# Patient Record
Sex: FEMALE | Race: WHITE | NOT HISPANIC OR LATINO | Employment: FULL TIME | URBAN - METROPOLITAN AREA
[De-identification: names, ages, dates, MRNs, and addresses within clinical notes are randomized per-mention and may not be internally consistent; named-entity substitution may affect disease eponyms.]

---

## 2019-10-11 ENCOUNTER — TELEPHONE (OUTPATIENT)
Dept: OBGYN CLINIC | Facility: HOSPITAL | Age: 49
End: 2019-10-11

## 2019-10-11 NOTE — TELEPHONE ENCOUNTER
Patient lvm asking for an appt for a 2nd opinion on an injury to her wrist & thumb area      [phone]    I did call the patient back & lvm asking her to call us to schedule the appt,

## 2019-10-17 ENCOUNTER — OFFICE VISIT (OUTPATIENT)
Dept: OBGYN CLINIC | Facility: CLINIC | Age: 49
End: 2019-10-17
Payer: COMMERCIAL

## 2019-10-17 VITALS
HEART RATE: 77 BPM | WEIGHT: 163.8 LBS | HEIGHT: 62 IN | SYSTOLIC BLOOD PRESSURE: 129 MMHG | BODY MASS INDEX: 30.14 KG/M2 | DIASTOLIC BLOOD PRESSURE: 89 MMHG

## 2019-10-17 DIAGNOSIS — M18.12 ARTHRITIS OF CARPOMETACARPAL (CMC) JOINT OF LEFT THUMB: Primary | ICD-10-CM

## 2019-10-17 PROCEDURE — 20660 APPLY REM FIXATION DEVICE: CPT | Performed by: ORTHOPAEDIC SURGERY

## 2019-10-17 PROCEDURE — 99203 OFFICE O/P NEW LOW 30 MIN: CPT | Performed by: ORTHOPAEDIC SURGERY

## 2019-10-17 RX ORDER — ALPRAZOLAM 0.5 MG/1
0.5 TABLET ORAL 2 TIMES DAILY PRN
Refills: 0 | COMMUNITY
Start: 2019-09-10

## 2019-10-17 RX ORDER — FLUNISOLIDE 0.25 MG/ML
SOLUTION NASAL
COMMUNITY
Start: 2018-09-17

## 2019-10-17 RX ADMIN — Medication 0.5 MEQ: at 20:45

## 2019-10-17 RX ADMIN — TRIAMCINOLONE ACETONIDE 20 MG: 40 INJECTION, SUSPENSION INTRA-ARTICULAR; INTRAMUSCULAR at 20:45

## 2019-10-17 RX ADMIN — LIDOCAINE HYDROCHLORIDE 2.5 ML: 10 INJECTION, SOLUTION INFILTRATION; PERINEURAL at 20:45

## 2019-10-17 NOTE — PATIENT INSTRUCTIONS
A cortisone injection was offered today and the patent wished to proceed  The patient may experience increased pain at the injection site for a few days  Topical ice is recommended today, followed by topical heat  NSAIDS and Tylenol may be used, as long as they are not contraindicated

## 2019-10-18 NOTE — PROGRESS NOTES
CHIEF COMPLAINT:  Chief Complaint   Patient presents with    Left Wrist - Pain       SUBJECTIVE:  Jesse Rubio is a 52y o  year old RHD female who presents left wrist pain  Patient was previously seen at [address] [place] [address] and 2019 for similar symptoms  She had initially tried some Tylenol, anti-inflammatories and was fitted for a thumb spica splint  She noted that this did not help with her symptoms  On 2019 she had a cortisone injection into the left thumb CMC joint  She had a good response with that however her symptoms came back  She states she has pain at the base of her thumb  She notes stiffness 1st thing in the morning  She has been trying some anti-inflammatories with minimal relief  She has been wearing a brace that provides immobilization for the thumb  She denies any numbness or tingling  PAST MEDICAL HISTORY:  History reviewed  No pertinent past medical history  PAST SURGICAL HISTORY:  Past Surgical History:   Procedure Laterality Date     SECTION      FOOT SURGERY      HERNIA REPAIR      LIPOSUCTION         FAMILY HISTORY:  History reviewed  No pertinent family history      SOCIAL HISTORY:  Social History     Tobacco Use    Smoking status: Never Smoker    Smokeless tobacco: Never Used   Substance Use Topics    Alcohol use: Yes     Frequency: Monthly or less     Comment: socially    Drug use: Never       MEDICATIONS:    Current Outpatient Medications:     ALPRAZolam (XANAX) 0 5 mg tablet, Take 0 5 mg by mouth 2 (two) times a day as needed, Disp: , Rfl: 0    flunisolide (NASALIDE) 25 MCG/ACT (0 025%) SOLN, flunisolide 25 mcg (0 025 %) nasal spray, Disp: , Rfl:     Multiple Vitamins-Minerals (MULTIVITAMIN ADULT PO), 1 DAILY, Disp: , Rfl:     ALLERGIES:  Allergies   Allergen Reactions    Diphenhydramine Tachycardia     Other reaction(s): Unknown  Other reaction(s): panic attack, swelling, hives      Amoxicillin Hives and Rash REVIEW OF SYSTEMS:  Review of Systems  ROS:   General: no fever, no chills  HEENT:  No loss of hearing or eyesight problems  Eyes:  No red eyes  Respiratory:  No coughing, shortness of breath or wheezing  Cardiovascular:  No chest pain, no palpitations  GI:  Abdomen soft nontender, denies nausea  Endocrine:  No muscle weakness, no frequent urination, no excessive thirst  Urinary:  No dysuria, no incontinence  Musculoskeletal: see HPI and PE  SKIN:  No skin rash, no dry skin  Neurological:  No headaches, no confusion  Psychiatric:  No suicide thoughts, no anxiety, no depression  Review of all other systems is negative    VITALS:  Vitals:    10/17/19 1427   BP: 129/89   Pulse: 77       LABS:  HgA1c: No results found for: HGBA1C  BMP: No results found for: GLUCOSE, CALCIUM, NA, K, CO2, CL, BUN, CREATININE    _____________________________________________________  PHYSICAL EXAMINATION:  General: well developed and well nourished, alert, oriented times 3 and appears comfortable  Psychiatric: Normal  HEENT: Trachea Midline, No torticollis  Pulmonary: No audible wheezing or respiratory distress   Skin: No masses, erythema, lacerations, fluctation, ulcerations  Neurovascular: Sensation Intact to the Median, Ulnar, Radial Nerve, Motor Intact to the Median, Ulnar, Radial Nerve and Pulses Intact    MUSCULOSKELETAL EXAMINATION:  left CMC Exam:  No adduction contracture  No hyperextension deformity of MCP joint  Positive localized tenderness over radial and dorsal aspect of thumb (CMC joint)  Grind test is Positive for pain and Positive for crepitus  No triggering or tenderness over the A1 pulley  No pain with Finkelsteins maneuver     ___________________________________________________  STUDIES REVIEWED:  No studies reviewed         PROCEDURES PERFORMED:  Hand/upper extremity injection  Date/Time: 10/17/2019 8:45 PM  Consent given by: patient  Site marked: site marked  Timeout: Immediately prior to procedure a time out was called to verify the correct patient, procedure, equipment, support staff and site/side marked as required   Supporting Documentation  Indications: pain   Procedure Details  Condition comment: Left thumb CMC joint   Preparation: Patient was prepped and draped in the usual sterile fashion  Needle size: 25 G  Ultrasound guidance: no  Approach: radial  Medications administered: 2 5 mL lidocaine 1 %; 0 5 mEq sodium bicarbonate 8 4 %; 20 mg triamcinolone acetonide 40 mg/mL    Patient tolerance: patient tolerated the procedure well with no immediate complications  Dressing:  Sterile dressing applied              _____________________________________________________  ASSESSMENT/PLAN:      Diagnoses and all orders for this visit:    Arthritis of carpometacarpal (CMC) joint of left thumb  -A cortisone injection was offered today and the patent wished to proceed  The patient may experience increased pain at the injection site for a few days  Topical ice is recommended today, followed by topical heat  NSAIDS and Tylenol may be used, as long as they are not contraindicated  - patient was advised to stop using the thumb spica brace as this may cause some more stiffness  - she can take Tylenol anti-inflammatories as needed for pain  - she was advised to use heat over the area  - she will follow-up with us in 2 weeks for re-evaluation    Follow Up:  Return in about 2 weeks (around 10/31/2019)  Work/school status:  No restrictions    To Do Next Visit:  Re-evaluation of current issue    General Discussions:  CMC Arthritis: The anatomy and physiology of carpometacarpal joint arthritis was discussed with the patient today in the office  Deterioration of the articular cartilage eventually leads to hypermobility at the thumb [facility] joint, resulting in joint subluxation, osteophyte formation, cystic changes within the trapezium and base of the first metacarpal, as well as subchondral sclerosis    Eventually, pain, limited mobility, and compensatory hyperextension at the metacarpophalangeal joint may develop  While normal activity and usage of the thumb joint may provide a painful experience to the patient, this typically does not result in damage to the thumb or hand  Treatment options include resting thumb spica splints to decreased joint edema, pain, and inflammation  Therapy exercises to strengthen the thenar musculature may relieve pain, but do not alter the overall continued development of osteoarthritis  Oral medications, topical medications, corticosteroid injections may decrease pain and increase overall function  Eventually, approximately 5% of patients may require surgical intervention                                                                                                                                                                                                   Scribe Attestation    I,:   Yeimi Pollock PA-C am acting as a scribe while in the presence of the attending physician :        I,:   Eamon Soto MD personally performed the services described in this documentation    as scribed in my presence :

## 2019-10-21 RX ORDER — TRIAMCINOLONE ACETONIDE 40 MG/ML
20 INJECTION, SUSPENSION INTRA-ARTICULAR; INTRAMUSCULAR
Status: COMPLETED | OUTPATIENT
Start: 2019-10-17 | End: 2019-10-17

## 2019-10-21 RX ORDER — LIDOCAINE HYDROCHLORIDE 10 MG/ML
2.5 INJECTION, SOLUTION INFILTRATION; PERINEURAL
Status: COMPLETED | OUTPATIENT
Start: 2019-10-17 | End: 2019-10-17

## 2019-10-31 ENCOUNTER — OFFICE VISIT (OUTPATIENT)
Dept: OBGYN CLINIC | Facility: CLINIC | Age: 49
End: 2019-10-31
Payer: COMMERCIAL

## 2019-10-31 VITALS
SYSTOLIC BLOOD PRESSURE: 111 MMHG | BODY MASS INDEX: 29.63 KG/M2 | DIASTOLIC BLOOD PRESSURE: 66 MMHG | WEIGHT: 161 LBS | HEIGHT: 62 IN | RESPIRATION RATE: 18 BRPM | HEART RATE: 88 BPM

## 2019-10-31 DIAGNOSIS — M18.12 ARTHRITIS OF CARPOMETACARPAL (CMC) JOINT OF LEFT THUMB: Primary | ICD-10-CM

## 2019-10-31 PROCEDURE — 99213 OFFICE O/P EST LOW 20 MIN: CPT | Performed by: ORTHOPAEDIC SURGERY

## 2019-10-31 NOTE — PROGRESS NOTES
CHIEF COMPLAINT:  Chief Complaint   Patient presents with    Left Wrist - Follow-up, Pain       SUBJECTIVE:  Dusty Garland is a 52y o  year old RHD female who presents to the office S/P left CMC injection performed 10/17/2019  Patient was previously seen at [facility] in 2019 for similar symptoms  Patient received previous cortisone injection 2019  Pt states that she does have some continued soreness but feels much better than prior to the CSI  PAST MEDICAL HISTORY:  History reviewed  No pertinent past medical history  PAST SURGICAL HISTORY:  Past Surgical History:   Procedure Laterality Date     SECTION      FOOT SURGERY      HERNIA REPAIR      LIPOSUCTION         FAMILY HISTORY:  Family History   Problem Relation Age of Onset   Pino COPD Mother     Diabetes Mother     Thyroid disease Paternal Aunt     Diabetes Maternal Grandmother        SOCIAL HISTORY:  Social History     Tobacco Use    Smoking status: Never Smoker    Smokeless tobacco: Never Used   Substance Use Topics    Alcohol use: Yes     Frequency: Monthly or less     Comment: socially    Drug use: Never       MEDICATIONS:    Current Outpatient Medications:     ALPRAZolam (XANAX) 0 5 mg tablet, Take 0 5 mg by mouth 2 (two) times a day as needed, Disp: , Rfl: 0    flunisolide (NASALIDE) 25 MCG/ACT (0 025%) SOLN, flunisolide 25 mcg (0 025 %) nasal spray, Disp: , Rfl:     Multiple Vitamins-Minerals (MULTIVITAMIN ADULT PO), 1 DAILY, Disp: , Rfl:     ALLERGIES:  Allergies   Allergen Reactions    Diphenhydramine Tachycardia     Other reaction(s): Unknown  Other reaction(s): panic attack, swelling, hives      Amoxicillin Hives and Rash       REVIEW OF SYSTEMS:  Review of Systems   Constitutional: Negative for chills, fever and unexpected weight change  HENT: Negative for hearing loss, nosebleeds and sore throat  Eyes: Negative for pain, redness and visual disturbance     Respiratory: Negative for cough, shortness of breath and wheezing  Cardiovascular: Negative for chest pain, palpitations and leg swelling  Gastrointestinal: Negative for abdominal pain, nausea and vomiting  Endocrine: Negative for polydipsia and polyuria  Genitourinary: Negative for dysuria and hematuria  Skin: Negative for rash and wound  Neurological: Negative for dizziness and headaches  Psychiatric/Behavioral: Negative for decreased concentration, dysphoric mood and suicidal ideas  The patient is not nervous/anxious  VITALS:  Vitals:    10/31/19 0936   BP: 111/66   Pulse: 88   Resp: 18       LABS:  HgA1c: No results found for: HGBA1C  BMP: No results found for: GLUCOSE, CALCIUM, NA, K, CO2, CL, BUN, CREATININE    _____________________________________________________  PHYSICAL EXAMINATION:  General: well developed and well nourished, alert, oriented times 3 and appears comfortable  Psychiatric: Normal  HEENT: Trachea Midline, No torticollis  Pulmonary: No audible wheezing or respiratory distress   Skin: No masses, erythema, lacerations, fluctation, ulcerations  Neurovascular: Sensation Intact to the Median, Ulnar, Radial Nerve, Motor Intact to the Median, Ulnar, Radial Nerve and Pulses Intact    MUSCULOSKELETAL EXAMINATION:  left CMC Exam:  No adduction contracture  No hyperextension deformity of MCP joint  Negative localized tenderness over radial and dorsal aspect of thumb (CMC joint)  Grind test is Negative for pain and Positive for crepitus  No triggering or tenderness over the A1 pulley  No pain with Finkelsteins maneuver     ___________________________________________________  STUDIES REVIEWED:  No studies reviewed         PROCEDURES PERFORMED:  Procedures  No Procedures performed today    _____________________________________________________  ASSESSMENT/PLAN:    S/P left CMC CSI   * Pt was advised to continue to take analgesics for pain as needed  * Pt was advised that if her pain returns and she can not control it she may return for repeat CSI      Follow Up:  Return if symptoms worsen or fail to improve          To Do Next Visit:  Re-evaluation of current issue      Scribe Attestation    I,:   Vivi Vargas am acting as a scribe while in the presence of the attending physician :        I,:   Juan Britton MD personally performed the services described in this documentation    as scribed in my presence :

## 2020-01-16 ENCOUNTER — APPOINTMENT (OUTPATIENT)
Dept: RADIOLOGY | Facility: CLINIC | Age: 50
End: 2020-01-16
Payer: COMMERCIAL

## 2020-01-16 ENCOUNTER — OFFICE VISIT (OUTPATIENT)
Dept: OBGYN CLINIC | Facility: CLINIC | Age: 50
End: 2020-01-16
Payer: COMMERCIAL

## 2020-01-16 VITALS
DIASTOLIC BLOOD PRESSURE: 78 MMHG | HEIGHT: 62 IN | SYSTOLIC BLOOD PRESSURE: 121 MMHG | BODY MASS INDEX: 29.63 KG/M2 | HEART RATE: 73 BPM | WEIGHT: 161 LBS

## 2020-01-16 DIAGNOSIS — M18.12 ARTHRITIS OF CARPOMETACARPAL (CMC) JOINT OF LEFT THUMB: ICD-10-CM

## 2020-01-16 DIAGNOSIS — M18.12 ARTHRITIS OF CARPOMETACARPAL (CMC) JOINT OF LEFT THUMB: Primary | ICD-10-CM

## 2020-01-16 PROCEDURE — 20600 DRAIN/INJ JOINT/BURSA W/O US: CPT | Performed by: ORTHOPAEDIC SURGERY

## 2020-01-16 PROCEDURE — 73110 X-RAY EXAM OF WRIST: CPT

## 2020-01-16 PROCEDURE — 99213 OFFICE O/P EST LOW 20 MIN: CPT | Performed by: ORTHOPAEDIC SURGERY

## 2020-01-16 RX ORDER — LIDOCAINE HYDROCHLORIDE 10 MG/ML
0.5 INJECTION, SOLUTION INFILTRATION; PERINEURAL
Status: COMPLETED | OUTPATIENT
Start: 2020-01-16 | End: 2020-01-16

## 2020-01-16 RX ORDER — LIDOCAINE HYDROCHLORIDE 10 MG/ML
2 INJECTION, SOLUTION INFILTRATION; PERINEURAL
Status: COMPLETED | OUTPATIENT
Start: 2020-01-16 | End: 2020-01-16

## 2020-01-16 RX ORDER — TRIAMCINOLONE ACETONIDE 40 MG/ML
20 INJECTION, SUSPENSION INTRA-ARTICULAR; INTRAMUSCULAR
Status: COMPLETED | OUTPATIENT
Start: 2020-01-16 | End: 2020-01-16

## 2020-01-16 RX ADMIN — LIDOCAINE HYDROCHLORIDE 2 ML: 10 INJECTION, SOLUTION INFILTRATION; PERINEURAL at 12:16

## 2020-01-16 RX ADMIN — Medication 0.05 MEQ: at 12:16

## 2020-01-16 RX ADMIN — LIDOCAINE HYDROCHLORIDE 0.5 ML: 10 INJECTION, SOLUTION INFILTRATION; PERINEURAL at 12:16

## 2020-01-16 RX ADMIN — TRIAMCINOLONE ACETONIDE 20 MG: 40 INJECTION, SUSPENSION INTRA-ARTICULAR; INTRAMUSCULAR at 12:16

## 2020-01-16 NOTE — PROGRESS NOTES
CHIEF COMPLAINT:  Chief Complaint   Patient presents with    Left Thumb - Follow-up       SUBJECTIVE:  Nehemias Felix is a 52y o  year old RHD female who presents to the office for a follow-up for left CMC osteoarthritis  Patient previously received cortisone steroid injections on 2019 at \[facility]\"" and 10/17/2019  Pt states that the CSI provides temporary relief  Patient states that she is currently having significant discomfort at the base of her left thumb  Patient states she has difficulty with grasping and lifting heavy objects  Patient states that she does not really take anything for pain  PAST MEDICAL HISTORY:  History reviewed  No pertinent past medical history  PAST SURGICAL HISTORY:  Past Surgical History:   Procedure Laterality Date     SECTION      FOOT SURGERY      HERNIA REPAIR      LIPOSUCTION         FAMILY HISTORY:  Family History   Problem Relation Age of Onset   Lotus Kugel COPD Mother     Diabetes Mother     Thyroid disease Paternal Aunt     Diabetes Maternal Grandmother        SOCIAL HISTORY:  Social History     Tobacco Use    Smoking status: Never Smoker    Smokeless tobacco: Never Used   Substance Use Topics    Alcohol use: Yes     Frequency: Monthly or less     Comment: socially    Drug use: Never       MEDICATIONS:    Current Outpatient Medications:     ALPRAZolam (XANAX) 0 5 mg tablet, Take 0 5 mg by mouth 2 (two) times a day as needed, Disp: , Rfl: 0    flunisolide (NASALIDE) 25 MCG/ACT (0 025%) SOLN, flunisolide 25 mcg (0 025 %) nasal spray, Disp: , Rfl:     Multiple Vitamins-Minerals (MULTIVITAMIN ADULT PO), 1 DAILY, Disp: , Rfl:     ALLERGIES:  Allergies   Allergen Reactions    Diphenhydramine Tachycardia     Other reaction(s): Unknown  Other reaction(s): panic attack, swelling, hives      Amoxicillin Hives and Rash       REVIEW OF SYSTEMS:  Review of Systems   Constitutional: Negative for chills, fever and unexpected weight change     HENT: Negative for hearing loss, nosebleeds and sore throat  Eyes: Negative for pain, redness and visual disturbance  Respiratory: Negative for cough, shortness of breath and wheezing  Cardiovascular: Negative for chest pain, palpitations and leg swelling  Gastrointestinal: Negative for abdominal pain, nausea and vomiting  Endocrine: Negative for polydipsia and polyuria  Genitourinary: Negative for dysuria and hematuria  Skin: Negative for rash and wound  Neurological: Negative for dizziness and headaches  Psychiatric/Behavioral: Negative for decreased concentration, dysphoric mood and suicidal ideas  The patient is not nervous/anxious          VITALS:  Vitals:    01/16/20 1153   BP: 121/78   Pulse: 73       LABS:  HgA1c: No results found for: HGBA1C  BMP: No results found for: GLUCOSE, CALCIUM, NA, K, CO2, CL, BUN, CREATININE    _____________________________________________________  PHYSICAL EXAMINATION:  General: well developed and well nourished, alert, oriented times 3 and appears comfortable  Psychiatric: Normal  HEENT: Trachea Midline, No torticollis  Pulmonary: No audible wheezing or strider  Cardiovascular: No discernable arrhythmia   Skin: No masses, erythema, lacerations, fluctation, ulcerations  Neurovascular: Sensation Intact to the Median, Ulnar, Radial Nerve, Motor Intact to the Median, Ulnar, Radial Nerve and Pulses Intact    MUSCULOSKELETAL EXAMINATION:  left CMC Exam:  No adduction contracture  No hyperextension deformity of MCP joint  Positive localized tenderness over radial and dorsal aspect of thumb (CMC joint)  Grind test is Positive for pain and Positive for crepitus  No triggering or tenderness over the A1 pulley  No pain with Finkelsteins maneuver             ___________________________________________________  STUDIES REVIEWED:  Three views of left wrist with Crystal performed today show CMC osteoarthritis with beaking and cystic changes    PROCEDURES PERFORMED:  Small joint arthrocentesis: L thumb CMC  Date/Time: 1/16/2020 12:16 PM  Consent given by: patient  Site marked: site marked  Timeout: Immediately prior to procedure a time out was called to verify the correct patient, procedure, equipment, support staff and site/side marked as required   Supporting Documentation  Indications: pain   Procedure Details  Location: thumb - L thumb CMC  Needle size: 27 G  Ultrasound guidance: no  Medications administered: 0 05 mEq sodium bicarbonate 8 4 %; 20 mg triamcinolone acetonide 40 mg/mL; 2 mL lidocaine 1 %; 0 5 mL lidocaine 1 %    Patient tolerance: patient tolerated the procedure well with no immediate complications  Dressing:  Sterile dressing applied             _____________________________________________________  ASSESSMENT/PLAN:    Left CMC Osteoarthritis  *CSI was administered today without complications  *Pt was advised that they may have increased pain for the next 24-36 hours from the CSI before feeling relief, during this time pt was advised to take NSAIDs and apply ice  *After the initial 36 hours pt is advised to apply heat and continue motion of the thumb to decrease discomfort  *Pt was advised that activity modification such as resting after increased activity or taking NSAIDs prior to increased activity may be necessary    *Pt was advised that these CSI should not be administered more frequently than 3 months apart  *Pt will follow up in 2 weeks        Follow Up:  Return if symptoms worsen or fail to improve  To Do Next Visit:  Re-evaluation of current issue    General Discussions:  [facility] Arthritis: The anatomy and physiology of carpometacarpal joint arthritis was discussed with the patient today in the office  Deterioration of the articular cartilage eventually leads to hypermobility at the thumb [facility] joint, resulting in joint subluxation, osteophyte formation, cystic changes within the trapezium and base of the first metacarpal, as well as subchondral sclerosis  Eventually, pain, limited mobility, and compensatory hyperextension at the metacarpophalangeal joint may develop  While normal activity and usage of the thumb joint may provide a painful experience to the patient, this typically does not result in damage to the thumb or hand  Treatment options include resting thumb spica splints to decreased joint edema, pain, and inflammation  Therapy exercises to strengthen the thenar musculature may relieve pain, but do not alter the overall continued development of osteoarthritis  Oral medications, topical medications, corticosteroid injections may decrease pain and increase overall function  Eventually, approximately 5% of patients may require surgical intervention                                                                                                                                                                                                       Scribe Attestation    I,:   Avtar Navarro am acting as a scribe while in the presence of the attending physician :        I,:   Prema Rodriguez MD personally performed the services described in this documentation    as scribed in my presence :

## 2020-04-20 ENCOUNTER — TELEPHONE (OUTPATIENT)
Dept: OBGYN CLINIC | Facility: HOSPITAL | Age: 50
End: 2020-04-20

## 2020-04-23 ENCOUNTER — TELEPHONE (OUTPATIENT)
Dept: OBGYN CLINIC | Facility: CLINIC | Age: 50
End: 2020-04-23

## 2020-04-28 ENCOUNTER — OFFICE VISIT (OUTPATIENT)
Dept: OBGYN CLINIC | Facility: CLINIC | Age: 50
End: 2020-04-28
Payer: COMMERCIAL

## 2020-04-28 VITALS
HEART RATE: 79 BPM | HEIGHT: 62 IN | SYSTOLIC BLOOD PRESSURE: 121 MMHG | RESPIRATION RATE: 18 BRPM | BODY MASS INDEX: 30.14 KG/M2 | WEIGHT: 163.8 LBS | DIASTOLIC BLOOD PRESSURE: 79 MMHG

## 2020-04-28 DIAGNOSIS — M18.12 ARTHRITIS OF CARPOMETACARPAL (CMC) JOINT OF LEFT THUMB: Primary | ICD-10-CM

## 2020-04-28 PROCEDURE — 20600 DRAIN/INJ JOINT/BURSA W/O US: CPT | Performed by: ORTHOPAEDIC SURGERY

## 2020-04-28 PROCEDURE — 99213 OFFICE O/P EST LOW 20 MIN: CPT | Performed by: ORTHOPAEDIC SURGERY

## 2020-04-28 RX ADMIN — LIDOCAINE HYDROCHLORIDE 2.5 ML: 10 INJECTION, SOLUTION INFILTRATION; PERINEURAL at 10:05

## 2020-04-28 RX ADMIN — Medication 0.05 MEQ: at 10:05

## 2020-04-28 RX ADMIN — TRIAMCINOLONE ACETONIDE 20 MG: 40 INJECTION, SUSPENSION INTRA-ARTICULAR; INTRAMUSCULAR at 10:05

## 2020-04-30 RX ORDER — TRIAMCINOLONE ACETONIDE 40 MG/ML
20 INJECTION, SUSPENSION INTRA-ARTICULAR; INTRAMUSCULAR
Status: COMPLETED | OUTPATIENT
Start: 2020-04-28 | End: 2020-04-28

## 2020-04-30 RX ORDER — LIDOCAINE HYDROCHLORIDE 10 MG/ML
2.5 INJECTION, SOLUTION INFILTRATION; PERINEURAL
Status: COMPLETED | OUTPATIENT
Start: 2020-04-28 | End: 2020-04-28

## 2020-06-03 ENCOUNTER — HOSPITAL ENCOUNTER (EMERGENCY)
Facility: HOSPITAL | Age: 50
Discharge: HOME | End: 2020-06-03
Attending: EMERGENCY MEDICINE | Admitting: EMERGENCY MEDICINE
Payer: COMMERCIAL

## 2020-06-03 VITALS
OXYGEN SATURATION: 100 % | DIASTOLIC BLOOD PRESSURE: 87 MMHG | BODY MASS INDEX: 31.48 KG/M2 | SYSTOLIC BLOOD PRESSURE: 172 MMHG | HEART RATE: 103 BPM | RESPIRATION RATE: 20 BRPM | WEIGHT: 171.08 LBS | TEMPERATURE: 98.3 F | HEIGHT: 62 IN

## 2020-06-03 DIAGNOSIS — S81.812A LEG LACERATION, LEFT, INITIAL ENCOUNTER: Primary | ICD-10-CM

## 2020-06-03 PROCEDURE — 99282 EMERGENCY DEPT VISIT SF MDM: CPT | Performed by: EMERGENCY MEDICINE

## 2020-06-03 PROCEDURE — 12001 RPR S/N/AX/GEN/TRNK 2.5CM/<: CPT | Performed by: EMERGENCY MEDICINE

## 2020-06-03 PROCEDURE — 90471 IMMUNIZATION ADMIN: CPT

## 2020-06-03 PROCEDURE — 99282 EMERGENCY DEPT VISIT SF MDM: CPT

## 2020-06-03 PROCEDURE — 90715 TDAP VACCINE 7 YRS/> IM: CPT | Performed by: EMERGENCY MEDICINE

## 2020-06-03 RX ADMIN — TETANUS TOXOID, REDUCED DIPHTHERIA TOXOID AND ACELLULAR PERTUSSIS VACCINE, ADSORBED 0.5 ML: 5; 2.5; 8; 8; 2.5 SUSPENSION INTRAMUSCULAR at 12:34

## 2021-03-05 ENCOUNTER — HOSPITAL ENCOUNTER (EMERGENCY)
Facility: HOSPITAL | Age: 51
Discharge: HOME | End: 2021-03-05
Attending: EMERGENCY MEDICINE
Payer: COMMERCIAL

## 2021-03-05 ENCOUNTER — HOSPITAL ENCOUNTER (EMERGENCY)
Facility: HOSPITAL | Age: 51
Discharge: HOME | End: 2021-03-05
Attending: EMERGENCY MEDICINE | Admitting: EMERGENCY MEDICINE
Payer: COMMERCIAL

## 2021-03-05 ENCOUNTER — APPOINTMENT (EMERGENCY)
Dept: CT IMAGING | Facility: HOSPITAL | Age: 51
End: 2021-03-05
Payer: COMMERCIAL

## 2021-03-05 ENCOUNTER — APPOINTMENT (EMERGENCY)
Dept: RADIOLOGY | Facility: HOSPITAL | Age: 51
End: 2021-03-05
Payer: COMMERCIAL

## 2021-03-05 VITALS
WEIGHT: 169.09 LBS | OXYGEN SATURATION: 98 % | SYSTOLIC BLOOD PRESSURE: 136 MMHG | BODY MASS INDEX: 30.93 KG/M2 | HEART RATE: 101 BPM | RESPIRATION RATE: 20 BRPM | TEMPERATURE: 98 F | DIASTOLIC BLOOD PRESSURE: 83 MMHG

## 2021-03-05 VITALS
OXYGEN SATURATION: 99 % | RESPIRATION RATE: 20 BRPM | SYSTOLIC BLOOD PRESSURE: 115 MMHG | TEMPERATURE: 98 F | DIASTOLIC BLOOD PRESSURE: 67 MMHG | HEART RATE: 97 BPM | HEIGHT: 62 IN | WEIGHT: 169.09 LBS | BODY MASS INDEX: 31.12 KG/M2

## 2021-03-05 DIAGNOSIS — R00.2 PALPITATIONS: Primary | ICD-10-CM

## 2021-03-05 DIAGNOSIS — R00.2 INTERMITTENT PALPITATIONS: Primary | ICD-10-CM

## 2021-03-05 DIAGNOSIS — R93.5 ABNORMAL CT SCAN, PELVIS: ICD-10-CM

## 2021-03-05 DIAGNOSIS — M75.31 CALCIFIC TENDINITIS OF RIGHT SHOULDER: ICD-10-CM

## 2021-03-05 DIAGNOSIS — I47.9 TACHYCARDIA, PAROXYSMAL (HCC): ICD-10-CM

## 2021-03-05 LAB
ALBUMIN SERPL BCP-MCNC: 3.8 G/DL (ref 3.5–5)
ALP SERPL-CCNC: 82 U/L (ref 46–116)
ALT SERPL W P-5'-P-CCNC: 30 U/L (ref 12–78)
ANION GAP SERPL CALCULATED.3IONS-SCNC: 8 MMOL/L (ref 4–13)
APTT PPP: 28 SECONDS (ref 23–37)
AST SERPL W P-5'-P-CCNC: 18 U/L (ref 5–45)
BASOPHILS # BLD AUTO: 0.03 THOUSANDS/ΜL (ref 0–0.1)
BASOPHILS NFR BLD AUTO: 0 % (ref 0–1)
BILIRUB DIRECT SERPL-MCNC: 0.11 MG/DL (ref 0–0.2)
BILIRUB SERPL-MCNC: 0.54 MG/DL (ref 0.2–1)
BUN SERPL-MCNC: 12 MG/DL (ref 5–25)
CALCIUM SERPL-MCNC: 9.4 MG/DL (ref 8.3–10.1)
CHLORIDE SERPL-SCNC: 103 MMOL/L (ref 100–108)
CO2 SERPL-SCNC: 23 MMOL/L (ref 21–32)
CREAT SERPL-MCNC: 0.81 MG/DL (ref 0.6–1.3)
D DIMER PPP FEU-MCNC: <0.27 UG/ML FEU
EOSINOPHIL # BLD AUTO: 0.1 THOUSAND/ΜL (ref 0–0.61)
EOSINOPHIL NFR BLD AUTO: 1 % (ref 0–6)
ERYTHROCYTE [DISTWIDTH] IN BLOOD BY AUTOMATED COUNT: 12.4 % (ref 11.6–15.1)
GFR SERPL CREATININE-BSD FRML MDRD: 85 ML/MIN/1.73SQ M
GLUCOSE SERPL-MCNC: 156 MG/DL (ref 65–140)
HCG SERPL QL: NEGATIVE
HCT VFR BLD AUTO: 42.6 % (ref 34.8–46.1)
HGB BLD-MCNC: 14.3 G/DL (ref 11.5–15.4)
IMM GRANULOCYTES # BLD AUTO: 0.04 THOUSAND/UL (ref 0–0.2)
IMM GRANULOCYTES NFR BLD AUTO: 0 % (ref 0–2)
INR PPP: 0.91 (ref 0.84–1.19)
LYMPHOCYTES # BLD AUTO: 1.58 THOUSANDS/ΜL (ref 0.6–4.47)
LYMPHOCYTES NFR BLD AUTO: 11 % (ref 14–44)
MAGNESIUM SERPL-MCNC: 2 MG/DL (ref 1.6–2.6)
MCH RBC QN AUTO: 32.4 PG (ref 26.8–34.3)
MCHC RBC AUTO-ENTMCNC: 33.6 G/DL (ref 31.4–37.4)
MCV RBC AUTO: 96 FL (ref 82–98)
MONOCYTES # BLD AUTO: 0.85 THOUSAND/ΜL (ref 0.17–1.22)
MONOCYTES NFR BLD AUTO: 6 % (ref 4–12)
NEUTROPHILS # BLD AUTO: 12.4 THOUSANDS/ΜL (ref 1.85–7.62)
NEUTS SEG NFR BLD AUTO: 82 % (ref 43–75)
NRBC BLD AUTO-RTO: 0 /100 WBCS
PLATELET # BLD AUTO: 245 THOUSANDS/UL (ref 149–390)
PMV BLD AUTO: 10.8 FL (ref 8.9–12.7)
POTASSIUM SERPL-SCNC: 3.8 MMOL/L (ref 3.5–5.3)
PROT SERPL-MCNC: 8 G/DL (ref 6.4–8.2)
PROTHROMBIN TIME: 12.4 SECONDS (ref 11.6–14.5)
RBC # BLD AUTO: 4.42 MILLION/UL (ref 3.81–5.12)
SODIUM SERPL-SCNC: 134 MMOL/L (ref 136–145)
T3 SERPL-MCNC: 0.9 NG/ML (ref 0.6–1.8)
T4 FREE SERPL-MCNC: 0.8 NG/DL (ref 0.76–1.46)
TROPONIN I SERPL-MCNC: <0.02 NG/ML
TSH SERPL DL<=0.05 MIU/L-ACNC: 2.14 UIU/ML (ref 0.36–3.74)
WBC # BLD AUTO: 15 THOUSAND/UL (ref 4.31–10.16)

## 2021-03-05 PROCEDURE — 36415 COLL VENOUS BLD VENIPUNCTURE: CPT | Performed by: EMERGENCY MEDICINE

## 2021-03-05 PROCEDURE — 99285 EMERGENCY DEPT VISIT HI MDM: CPT | Performed by: EMERGENCY MEDICINE

## 2021-03-05 PROCEDURE — 80048 BASIC METABOLIC PNL TOTAL CA: CPT | Performed by: EMERGENCY MEDICINE

## 2021-03-05 PROCEDURE — 84703 CHORIONIC GONADOTROPIN ASSAY: CPT | Performed by: EMERGENCY MEDICINE

## 2021-03-05 PROCEDURE — 80076 HEPATIC FUNCTION PANEL: CPT | Performed by: EMERGENCY MEDICINE

## 2021-03-05 PROCEDURE — 84480 ASSAY TRIIODOTHYRONINE (T3): CPT | Performed by: EMERGENCY MEDICINE

## 2021-03-05 PROCEDURE — 84443 ASSAY THYROID STIM HORMONE: CPT | Performed by: EMERGENCY MEDICINE

## 2021-03-05 PROCEDURE — 99284 EMERGENCY DEPT VISIT MOD MDM: CPT

## 2021-03-05 PROCEDURE — 73030 X-RAY EXAM OF SHOULDER: CPT

## 2021-03-05 PROCEDURE — 85379 FIBRIN DEGRADATION QUANT: CPT | Performed by: EMERGENCY MEDICINE

## 2021-03-05 PROCEDURE — 85025 COMPLETE CBC W/AUTO DIFF WBC: CPT | Performed by: EMERGENCY MEDICINE

## 2021-03-05 PROCEDURE — 83735 ASSAY OF MAGNESIUM: CPT | Performed by: EMERGENCY MEDICINE

## 2021-03-05 PROCEDURE — 99285 EMERGENCY DEPT VISIT HI MDM: CPT

## 2021-03-05 PROCEDURE — 96361 HYDRATE IV INFUSION ADD-ON: CPT

## 2021-03-05 PROCEDURE — 84484 ASSAY OF TROPONIN QUANT: CPT | Performed by: EMERGENCY MEDICINE

## 2021-03-05 PROCEDURE — 71045 X-RAY EXAM CHEST 1 VIEW: CPT

## 2021-03-05 PROCEDURE — 85610 PROTHROMBIN TIME: CPT | Performed by: EMERGENCY MEDICINE

## 2021-03-05 PROCEDURE — 85730 THROMBOPLASTIN TIME PARTIAL: CPT | Performed by: EMERGENCY MEDICINE

## 2021-03-05 PROCEDURE — 74177 CT ABD & PELVIS W/CONTRAST: CPT

## 2021-03-05 PROCEDURE — 96374 THER/PROPH/DIAG INJ IV PUSH: CPT

## 2021-03-05 PROCEDURE — 84439 ASSAY OF FREE THYROXINE: CPT | Performed by: EMERGENCY MEDICINE

## 2021-03-05 PROCEDURE — 99284 EMERGENCY DEPT VISIT MOD MDM: CPT | Performed by: EMERGENCY MEDICINE

## 2021-03-05 RX ORDER — KETOROLAC TROMETHAMINE 30 MG/ML
15 INJECTION, SOLUTION INTRAMUSCULAR; INTRAVENOUS ONCE
Status: COMPLETED | OUTPATIENT
Start: 2021-03-05 | End: 2021-03-05

## 2021-03-05 RX ORDER — LIDOCAINE 50 MG/G
1 PATCH TOPICAL ONCE
Status: DISCONTINUED | OUTPATIENT
Start: 2021-03-05 | End: 2021-03-05 | Stop reason: HOSPADM

## 2021-03-05 RX ORDER — PROPRANOLOL HYDROCHLORIDE 1 MG/ML
1 INJECTION, SOLUTION INTRAVENOUS ONCE
Status: DISCONTINUED | OUTPATIENT
Start: 2021-03-05 | End: 2021-03-05

## 2021-03-05 RX ADMIN — IOHEXOL 100 ML: 350 INJECTION, SOLUTION INTRAVENOUS at 10:26

## 2021-03-05 RX ADMIN — SODIUM CHLORIDE 1000 ML: 0.9 INJECTION, SOLUTION INTRAVENOUS at 08:29

## 2021-03-05 RX ADMIN — LIDOCAINE 5% 1 PATCH: 700 PATCH TOPICAL at 08:40

## 2021-03-05 RX ADMIN — KETOROLAC TROMETHAMINE 15 MG: 30 INJECTION, SOLUTION INTRAMUSCULAR at 08:39

## 2021-03-05 NOTE — ED PROVIDER NOTES
History  Chief Complaint   Patient presents with    Rapid Heart Rate     pt states that her heart rate has been consistently over 100 since last night  hx of thyroid issues    Shoulder Pain     pt c/o right shoulder pain since      Patient is 40-year-old female with past medical history of hyperthyroidism secondary to Graves disease but not currently on medication due to prior medication causing hypothyroidism, presents to the emergency department complaining of fluctuating heart rate and blood pressure over the past 2 weeks  Patient reports she has been having intermittent palpitations, mostly at night waking her from sleep and when she checks her heart rate it is been very high when she has a palpitations, as high as 140 bpm   Patient also has noticed on a couple of occasions her blood pressure has been significantly elevated and she denies any prior history of hypertension  She states occasionally she will feel lightheaded when she gets the palpitations but otherwise denies any chest pain, dyspnea, abdominal pain, nausea, vomiting, recent change in bowel habits, urinary symptoms, blood per rectum or melena, fever, chills, cough or URI symptoms, skin rash or color change, leg pain or swelling, focal neurologic deficits  Denies any personal or family history of arrhythmia  History provided by:  Patient   used: No        Prior to Admission Medications   Prescriptions Last Dose Informant Patient Reported? Taking? ALPRAZolam (XANAX) 0 5 mg tablet  Self Yes No   Sig: Take 0 5 mg by mouth 2 (two) times a day as needed   Multiple Vitamins-Minerals (MULTIVITAMIN ADULT PO)  Self Yes No   Si DAILY   flunisolide (NASALIDE) 25 MCG/ACT (0 025%) SOLN  Self Yes No   Sig: flunisolide 25 mcg (0 025 %) nasal spray      Facility-Administered Medications: None       History reviewed  No pertinent past medical history      Past Surgical History:   Procedure Laterality Date     SECTION      FOOT SURGERY      HERNIA REPAIR      LIPOSUCTION         Family History   Problem Relation Age of Onset    COPD Mother     Diabetes Mother     Thyroid disease Paternal Aunt     Diabetes Maternal Grandmother      I have reviewed and agree with the history as documented  E-Cigarette/Vaping    E-Cigarette Use Never User      E-Cigarette/Vaping Substances     Social History     Tobacco Use    Smoking status: Never Smoker    Smokeless tobacco: Never Used   Substance Use Topics    Alcohol use: Yes     Frequency: Monthly or less     Drinks per session: 1 or 2     Binge frequency: Never     Comment: socially    Drug use: Never       Review of Systems   Constitutional: Negative for chills and fever  HENT: Negative for congestion, ear pain, rhinorrhea and sore throat  Respiratory: Negative for cough, chest tightness, shortness of breath and wheezing  Cardiovascular: Positive for palpitations  Negative for chest pain  Gastrointestinal: Negative for abdominal pain, blood in stool, constipation, diarrhea, nausea and vomiting  Genitourinary: Negative for dysuria, flank pain, frequency and hematuria  Musculoskeletal: Negative for back pain, neck pain and neck stiffness  Skin: Negative for color change, pallor, rash and wound  Allergic/Immunologic: Negative for immunocompromised state  Neurological: Negative for dizziness, syncope, weakness, light-headedness, numbness and headaches  Hematological: Negative for adenopathy  Psychiatric/Behavioral: Negative for confusion and decreased concentration  All other systems reviewed and are negative  Physical Exam  Physical Exam  Vitals signs and nursing note reviewed  Constitutional:       General: She is not in acute distress  Appearance: Normal appearance  She is well-developed  She is not ill-appearing, toxic-appearing or diaphoretic  HENT:      Head: Normocephalic and atraumatic        Right Ear: External ear normal  Left Ear: External ear normal       Mouth/Throat:      Comments: Orpharyngeal exam deferred at this time due to risk of exposure to COVID-19 during current pandemic  Patient has no oropharyngeal complaints  Eyes:      Extraocular Movements: Extraocular movements intact  Conjunctiva/sclera: Conjunctivae normal    Neck:      Musculoskeletal: Normal range of motion and neck supple  No neck rigidity  Vascular: No JVD  Cardiovascular:      Rate and Rhythm: Regular rhythm  Tachycardia present  Pulses: Normal pulses  Heart sounds: Normal heart sounds  No murmur  No friction rub  No gallop  Comments: Initial heart rate 140, sinus tachycardia on monitor  Pulmonary:      Effort: Pulmonary effort is normal  No respiratory distress  Breath sounds: Normal breath sounds  No wheezing, rhonchi or rales  Abdominal:      General: There is no distension  Palpations: Abdomen is soft  Tenderness: There is no abdominal tenderness  There is no guarding or rebound  Musculoskeletal: Normal range of motion  General: No swelling or tenderness  Right lower leg: No edema  Left lower leg: No edema  Lymphadenopathy:      Cervical: No cervical adenopathy  Skin:     General: Skin is warm and dry  Coloration: Skin is not pale  Findings: No erythema or rash  Neurological:      General: No focal deficit present  Mental Status: She is alert and oriented to person, place, and time  Sensory: No sensory deficit  Motor: No weakness     Psychiatric:         Mood and Affect: Mood normal          Behavior: Behavior normal          Vital Signs  ED Triage Vitals [03/05/21 0811]   Temperature Pulse Respirations Blood Pressure SpO2   98 °F (36 7 °C) (!) 141 19 (!) 189/92 100 %      Temp Source Heart Rate Source Patient Position - Orthostatic VS BP Location FiO2 (%)   Oral Monitor Sitting Right arm --      Pain Score       Worst Possible Pain         Vitals: 03/05/21 0915 03/05/21 1045 03/05/21 1100 03/05/21 1130   BP:  106/66 107/66 115/67   BP Location:   Right arm Right arm   Pulse: 91 88 89 97   Resp: 18 16 16 20   Temp:       TempSrc:       SpO2: 98% 98% 96% 99%   Weight:       Height:           Visual Acuity      ED Medications  Medications   sodium chloride 0 9 % bolus 1,000 mL (0 mL Intravenous Stopped 3/5/21 1018)   ketorolac (TORADOL) injection 15 mg (15 mg Intravenous Given 3/5/21 0839)   iohexol (OMNIPAQUE) 350 MG/ML injection (MULTI-DOSE) 100 mL (100 mL Intravenous Given 3/5/21 1026)       Diagnostic Studies  Results Reviewed     Procedure Component Value Units Date/Time    T3 [[id number]]  (Normal) Collected: 03/05/21 0826    Lab Status: Final result Specimen: Blood from Arm, Right Updated: 03/05/21 1159     T3, Total 0 90 ng/mL     T4, free [[id number]] Collected: 03/05/21 0826    Lab Status: In process Specimen: Blood from Arm, Right Updated: 03/05/21 1150    TSH, 3rd generation with Free T4 reflex [[id number]]  (Normal) Collected: 03/05/21 0826    Lab Status: Final result Specimen: Blood from Arm, Right Updated: 03/05/21 0907     TSH 3RD GENERATON 2 143 uIU/mL     Narrative:      Patients undergoing fluorescein dye angiography may retain small amounts of fluorescein in the body for 48-72 hours post procedure  Samples containing fluorescein can produce falsely depressed TSH values  If the patient had this procedure,a specimen should be resubmitted post fluorescein clearance        hCG, qualitative pregnancy [[id number]]  (Normal) Collected: 03/05/21 0826    Lab Status: Final result Specimen: Blood from Arm, Right Updated: 03/05/21 0907     Preg, Serum Negative    Basic metabolic panel [[id number]]  (Abnormal) Collected: 03/05/21 0826    Lab Status: Final result Specimen: Blood from Arm, Right Updated: 03/05/21 0907     Sodium 134 mmol/L      Potassium 3 8 mmol/L      Chloride 103 mmol/L      CO2 23 mmol/L      ANION GAP 8 mmol/L      BUN 12 mg/dL Creatinine 0 81 mg/dL      Glucose 156 mg/dL      Calcium 9 4 mg/dL      eGFR 85 ml/min/1 73sq m     Narrative:      [place] guidelines for Chronic Kidney Disease (CKD):     Stage 1 with normal or high GFR (GFR > 90 mL/min/1 73 square meters)    Stage 2 Mild CKD (GFR = 60-89 mL/min/1 73 square meters)    Stage 3A Moderate CKD (GFR = 45-59 mL/min/1 73 square meters)    Stage 3B Moderate CKD (GFR = 30-44 mL/min/1 73 square meters)    Stage 4 Severe CKD (GFR = 15-29 mL/min/1 73 square meters)    Stage 5 End Stage CKD (GFR <15 mL/min/1 73 square meters)  Note: GFR calculation is accurate only with a steady state creatinine    Hepatic function panel [[id number]]  (Normal) Collected: 03/05/21 0826    Lab Status: Final result Specimen: Blood from Arm, Right Updated: 03/05/21 0907     Total Bilirubin 0 54 mg/dL      Bilirubin, Direct 0 11 mg/dL      Alkaline Phosphatase 82 U/L      AST 18 U/L      ALT 30 U/L      Total Protein 8 0 g/dL      Albumin 3 8 g/dL     Magnesium [[id number]]  (Normal) Collected: 03/05/21 0826    Lab Status: Final result Specimen: Blood from Arm, Right Updated: 03/05/21 0907     Magnesium 2 0 mg/dL     Troponin I [[id number]]  (Normal) Collected: 03/05/21 0826    Lab Status: Final result Specimen: Blood from Arm, Right Updated: 03/05/21 0900     Troponin I <0 02 ng/mL     D-Dimer [[id number]]  (Normal) Collected: 03/05/21 0826    Lab Status: Final result Specimen: Blood from Arm, Right Updated: 03/05/21 0858     D-Dimer, Quant <0 27 ug/ml FEU     Protime-INR [[id number]]  (Normal) Collected: 03/05/21 0826    Lab Status: Final result Specimen: Blood from Arm, Right Updated: 03/05/21 0854     Protime 12 4 seconds      INR 0 91    APTT [[id number]]  (Normal) Collected: 03/05/21 0826    Lab Status: Final result Specimen: Blood from Arm, Right Updated: 03/05/21 0854     PTT 28 seconds     CBC and differential [[id number]]  (Abnormal) Collected: 03/05/21 0826    Lab Status: Final result Specimen: Blood from Arm, Right Updated: 03/05/21 0835     WBC 15 00 Thousand/uL      RBC 4 42 Million/uL      Hemoglobin 14 3 g/dL      Hematocrit 42 6 %      MCV 96 fL      MCH 32 4 pg      MCHC 33 6 g/dL      RDW 12 4 %      MPV 10 8 fL      Platelets 063 Thousands/uL      nRBC 0 /100 WBCs      Neutrophils Relative 82 %      Immat GRANS % 0 %      Lymphocytes Relative 11 %      Monocytes Relative 6 %      Eosinophils Relative 1 %      Basophils Relative 0 %      Neutrophils Absolute 12 40 Thousands/µL      Immature Grans Absolute 0 04 Thousand/uL      Lymphocytes Absolute 1 58 Thousands/µL      Monocytes Absolute 0 85 Thousand/µL      Eosinophils Absolute 0 10 Thousand/µL      Basophils Absolute 0 03 Thousands/µL                  CT abdomen pelvis with contrast   Final Result by Karen Li MD (03/05 1055)      No adrenal mass seen      Distended urinary bladder with the resultant mild dilation of the pelvicalyceal system noted  Small amount of free fluid in the pelvis probably physiological      Focal enhancing area seen within the endometrium at its anterior aspect near the fundal and upper body, can be characterized with ultrasound   A follow-up notification has been created in the Epic      Workstation performed: [id number]         XR chest 1 view portable   Final Result by Albert Darling MD (03/05 5540)      No acute cardiopulmonary disease  Workstation performed: [id number]         XR shoulder 2+ views RIGHT   Final Result by Albert Darling MD (03/05 2230)      Findings consistent with calcific tendinitis of the right shoulder        No acute osseous abnormality      Possible chondrocalcinosis of the inferior glenoid labrum            Workstation performed: [id number]                    Procedures  ECG 12 Lead Documentation Only    Date/Time: 3/5/2021 9:01 AM  Performed by: Vaishnavi Hollingsworth DO  Authorized by: Vaishnavi Hollingsworth DO     ECG reviewed by me, the ED Provider: yes    Patient location:  ED  Previous ECG:     Previous ECG:  Unavailable  Rate:     ECG rate:  148    ECG rate assessment: tachycardic    Rhythm:     Rhythm: sinus tachycardia    Ectopy:     Ectopy: none    QRS:     QRS axis:  Normal    QRS intervals:  Normal  Conduction:     Conduction: normal    ST segments:     ST segments:  Non-specific  T waves:     T waves: normal               ED Course  ED Course as of Mar 05 1521   Fri Mar 05, 2021   0851 Propanolol was ordered however patient's heart rate and blood pressure both improved on its own  Will hold off on giving any rate controlling medications at this time and observe in the ED       1007 Discussed workup with patient being essentially negative however I did discuss my concern about pheochromocytoma as possibility to explain her fluctuating symptoms  I offered a CT scan of the abdomen and pelvis to rule out adrenal mass and patient would like to pursue this  Will also check plasma metanephrine level  Will likely send home with script for 24 hour urine studies and close follow-up with her PCP in endocrinology  1040 According to lab, the metanephrine plasma blood draw should be done after 10-12 hours of fasting and patient has already eaten today so will cancel order and likely send home with a script for this to be done outpatient  [address] on-call endocrinologist, Dr Ania Peña  [id number] Updated patient about CT findings of endometrial thickening and patient does have an OBGYN and I advised her to call them immediately for close follow-up and for outpatient ultrasound  Still waiting on endocrinology to call back  I will also consult with Cardiology prior to discharge  [facility] with cardiologist, Dr Myles Bryant who was also concerned about pheochromocytoma and recommended endo follow-up and outpatient urine and blood tests to rule this out    I then spoke with on-call endocrinologist, Dr Ania Peña who agreed with outpatient workup and follow-up  She did not recommend starting a beta-blocker in case this is a pheochromocytoma  [id number] Discussed ED return parameters with patient  SBIRT 20yo+      Most Recent Value   SBIRT (22 yo +)   In order to provide better care to our patients, we are screening all of our patients for alcohol and drug use  Would it be okay to ask you these screening questions? Yes Filed at: 03/05/2021 0825   Initial Alcohol Screen: US AUDIT-C    1  How often do you have a drink containing alcohol?  0 Filed at: 03/05/2021 0825   2  How many drinks containing alcohol do you have on a typical day you are drinking? 0 Filed at: 03/05/2021 0825   3b  FEMALE Any Age, or MALE 65+: How often do you have 4 or more drinks on one occassion? 0 Filed at: 03/05/2021 0825   Audit-C Score  0 Filed at: 03/05/2021 0984   ZAINAB: How many times in the past year have you    Used an illegal drug or used a prescription medication for non-medical reasons? Never Filed at: 03/05/2021 0825                    MDM  Number of Diagnoses or Management Options  Diagnosis management comments: 59-year-old female presents to the ED with episodic tachycardia and hypertension, palpitations over the past couple of weeks  Differential includes hyperthyroidism, electrolyte or metabolic abnormality, arrhythmia, PE, pheochromocytoma, anxiety  Will workup with cardiac labs, D-dimer, TSH, T3, EKG and chest x-ray  Will give IVF and propanolol for beta blockade given tachycardia and hypertension with possibility of thyroid abnormality         Amount and/or Complexity of Data Reviewed  Clinical lab tests: reviewed and ordered  Tests in the radiology section of CPT®: ordered and reviewed  Tests in the medicine section of CPT®: ordered and reviewed  Independent visualization of images, tracings, or specimens: yes        Disposition  Final diagnoses:   Intermittent palpitations   Tachycardia, paroxysmal (HCC)   Abnormal CT scan, pelvis - Endometrial thickening, needs GYN follow up for outpatient pelvic ultrasound   Calcific tendinitis of right shoulder     Time reflects when diagnosis was documented in both MDM as applicable and the Disposition within this note     Time User Action Codes Description Comment    3/5/2021 11:50 AM Shazia Dilling E Add [R00 2] Intermittent palpitations     3/5/2021 11:50 AM Shazia Dilling E Add [I47 9] Tachycardia, paroxysmal ([address] )     3/5/2021 11:50 AM Shazia Dilling E Add [R93 5] Abnormal CT scan, pelvis     3/5/2021 11:50 AM Shazia Dilling E Modify [R93 5] Abnormal CT scan, pelvis Endometrial thickening, needs GYN follow up for outpatient pelvic ultrasound    3/5/2021  3:21 PM Shazia Dilling E Add [M75 31] Calcific tendinitis of right shoulder       ED Disposition     ED Disposition Condition Date/Time Comment    Discharge Stable Fri Mar 5, 2021 11:51 AM Dusty Garland discharge to home/self care              Follow-up Information     Follow up With Specialties Details Why Contact Info Additional Information    [employer]folink  Call  To establish care with a primary care doctor [phone]       Samantha Gudino MD Endocrinology, Internal Medicine Schedule an appointment as soon as possible for a visit   [address] [facility] Endocrinology [place] [facility] Schedule an appointment as soon as possible for a visit   [address] [facility] [address] [phone] [address] [facility] Endocrinology [place], [address] [place], [place], [address] [address] Cardiology Schedule an appointment as soon as possible for a visit   [address] [id number]  [facility] [address] Cardiology [address] , [address] [place], [place], [address] Emergency Department Emergency Medicine Go to  If symptoms worsen [address] [facility]  [address] [facility] Emergency Department, [address] [facility], [place], [place], [address]          Discharge Medication List as of 3/5/2021 11:55 AM      CONTINUE these medications which have NOT CHANGED    Details   ALPRAZolam (XANAX) 0 5 mg tablet Take 0 5 mg by mouth 2 (two) times a day as needed, Starting Tue 9/10/2019, Historical Med      flunisolide (NASALIDE) 25 MCG/ACT (0 025%) SOLN flunisolide 25 mcg (0 025 %) nasal spray, Historical Med      Multiple Vitamins-Minerals (MULTIVITAMIN ADULT PO) 1 DAILY, Historical Med           Outpatient Discharge Orders   Metanephrine, Fractionated Plasma Free   Standing Status: Future Standing Exp  Date: 03/05/22     Catecholamines, fractionated, plasma   Standing Status: Future Standing Exp  Date: 03/05/22     Catecholamine+VMA, 24-Hr Urine   Standing Status: Future Standing Exp  Date: 03/05/22     Creatinine, urine, 24 hour   Standing Status: Future Standing Exp  Date: 03/05/22     Metanephrines Fractionated, urine, 24 hour   Standing Status: Future Standing Exp   Date: 03/05/22       PDMP Review     None          ED Provider  Electronically Signed by           Sushil Livingston DO  03/05/21 4606

## 2021-03-05 NOTE — Clinical Note
Lotus Geesteveheraclio was seen and treated in our emergency department on 3/5/2021  Diagnosis: Palpitations    Jessica    She may return on this date: 03/10/2021    Patient needs time to schedule her outpatient visits     If you have any questions or concerns, please don't hesitate to call        Maryam Blair MD    ______________________________           _______________          _______________  [facility] Representative                              Date                                Time

## 2021-03-05 NOTE — ED NOTES
Discharge instructions reviewed by the waynean   Patient ambulated out of department, steady gait, vss  Patient accompanied out of department by her           Farooq Sanchez RN  03/05/21 9442

## 2021-03-05 NOTE — DISCHARGE INSTRUCTIONS
Collect all of your urine over a 24 hour period  When you wake up, the first void of the morning should be flushed, every void after that for the remainder of the day and overnight including the first void of the next morning should be collected and brought to the lab  Heart Palpitations   WHAT YOU NEED TO KNOW:   Heart palpitations are feelings that your heart races, jumps, throbs, or flutters  You may feel extra beats, no beats for a short time, or skipped beats  You may have these feelings in your chest, throat, or neck  They may happen when you are sitting, standing, or lying  Heart palpitations may be frightening, but are usually not caused by a serious problem  DISCHARGE INSTRUCTIONS:   Call 911 or have someone else call for any of the following: You have any of the following signs of a heart attack:      Squeezing, pressure, or pain in your chest    You may  also have any of the following:     Discomfort or pain in your back, neck, jaw, stomach, or arm    Shortness of breath    Nausea or vomiting    Lightheadedness or a sudden cold sweat    You have any of the following signs of a stroke:      Numbness or drooping on one side of your face     Weakness in an arm or leg    Confusion or difficulty speaking    Dizziness, a severe headache, or vision loss    You faint or lose consciousness  Seek care immediately if:   Your palpitations happen more often or last longer than usual      You have palpitations and shortness of breath, nausea, sweating, or dizziness  Contact your healthcare provider if:   You have questions or concerns about your condition or care  Follow up with your healthcare provider as directed: You may need to follow up with a cardiologist  Guillermina Silva may need tests to check for heart problems that cause palpitations  Write down your questions so you remember to ask them during your visits     Keep a record:  Write down when your palpitations start and stop, what you were doing when they started, and your symptoms  Keep track of what you ate or drank within a few hours of your palpitations  Include anything that seemed to help your symptoms, such as lying down or holding your breath  This record will help you and your healthcare provider learn what triggers your palpitations  Bring this record with you to your follow up visits  Help prevent heart palpitations:   Manage stress and anxiety  Find ways to relax such as listening to music, meditating, or doing yoga  Exercise can also help decrease stress and anxiety  Talk to someone you trust about your stress or anxiety  You can also talk to a therapist      Get plenty of sleep every night  Ask your healthcare provider how much sleep you need each night  Do not drink caffeine or alcohol  Caffeine and alcohol can make your palpitations worse  Caffeine is found in soda, coffee, tea, chocolate, and drinks that increase your energy  Do not smoke  Nicotine and other chemicals in cigarettes and cigars may damage your heart and blood vessels  Ask your healthcare provider for information if you currently smoke and need help to quit  E-cigarettes or smokeless tobacco still contain nicotine  Talk to your healthcare provider before you use these products  Do not use illegal drugs  Talk to your healthcare provider if you use illegal drugs and want help to quit  © Copyright [address] Information is for End User's use only and may not be sold, redistributed or otherwise used for commercial purposes  All illustrations and images included in CareNotes® are the copyrighted property of A D A M , Inc  or Zeinab Dirver   The above information is an  only  It is not intended as medical advice for individual conditions or treatments  Talk to your doctor, nurse or pharmacist before following any medical regimen to see if it is safe and effective for you

## 2021-03-06 NOTE — ED PROVIDER NOTES
History  Chief Complaint   Patient presents with    Palpitations     Pt reports she was here earlier for heart palpitations and they are continuing  HPI    47 yo F hx of hyperthyroidism 2/2 graves disease presents to the ed for eval of palpitations  Patient seen earlier in the ER had extensive work up including cardiac markers, tsh, electrolytes, all within normal limits for patient  Ct scan looking for adrenal mass, which was negative  Patient had outpatient metanephrines ordered as well  Did have unexplained leukocytosis  However, patient states she had palpitations that started again tonight  She has no symptoms, her HR is 91 now  She took xanax, which helped her symptoms  She has no chest pain no sob  No symptoms at all currently  She states her symptoms have been present for the past several weeks  No changes to the quality or types of symptoms tonight  Discussed with her given her extensive work up and follow up already, little utility to repeating labs at this point  Would benefit from holter monitor, she has referral to cardiology already  Return precautions discussed including if she develops chest pain or sob  Otherwise, if same symptoms for past few weeks, with no changes, ok for cardiology follow up  Prior to Admission Medications   Prescriptions Last Dose Informant Patient Reported? Taking? ALPRAZolam (XANAX) 0 5 mg tablet  Self Yes No   Sig: Take 0 5 mg by mouth 2 (two) times a day as needed   Multiple Vitamins-Minerals (MULTIVITAMIN ADULT PO)  Self Yes No   Si DAILY   flunisolide (NASALIDE) 25 MCG/ACT (0 025%) SOLN  Self Yes No   Sig: flunisolide 25 mcg (0 025 %) nasal spray      Facility-Administered Medications: None       History reviewed  No pertinent past medical history      Past Surgical History:   Procedure Laterality Date     SECTION      FOOT SURGERY      HERNIA REPAIR      LIPOSUCTION         Family History   Problem Relation Age of Onset    COPD Mother    Alvarez Diabetes Mother     Thyroid disease Paternal Aunt     Diabetes Maternal Grandmother      I have reviewed and agree with the history as documented  E-Cigarette/Vaping    E-Cigarette Use Never User      E-Cigarette/Vaping Substances     Social History     Tobacco Use    Smoking status: Never Smoker    Smokeless tobacco: Never Used   Substance Use Topics    Alcohol use: Yes     Frequency: Monthly or less     Drinks per session: 1 or 2     Binge frequency: Never     Comment: socially    Drug use: Never       Review of Systems   Constitutional: Negative for chills, fatigue and fever  HENT: Negative for sore throat  Eyes: Negative for redness and visual disturbance  Respiratory: Negative for cough and shortness of breath  Cardiovascular: Positive for palpitations  Negative for chest pain  Gastrointestinal: Negative for abdominal pain, diarrhea and nausea  Genitourinary: Negative for difficulty urinating, dysuria and pelvic pain  Musculoskeletal: Negative for back pain  Skin: Negative for rash  Neurological: Negative for syncope, weakness and headaches  All other systems reviewed and are negative  Physical Exam  Physical Exam  Vitals signs and nursing note reviewed  Constitutional:       General: She is not in acute distress  HENT:      Head: Normocephalic and atraumatic  Eyes:      Conjunctiva/sclera: Conjunctivae normal    Neck:      Musculoskeletal: Normal range of motion  Cardiovascular:      Rate and Rhythm: Normal rate and regular rhythm  Heart sounds: Normal heart sounds  Comments: Hr 92 at this time  Pulmonary:      Effort: Pulmonary effort is normal  No respiratory distress  Breath sounds: Normal breath sounds  Abdominal:      General: Bowel sounds are normal       Palpations: Abdomen is soft  Tenderness: There is no abdominal tenderness  Musculoskeletal: Normal range of motion  Skin:     General: Skin is warm and dry  Findings: No rash  Neurological:      Mental Status: She is alert and oriented to person, place, and time  Cranial Nerves: No cranial nerve deficit  Sensory: No sensory deficit  Motor: No abnormal muscle tone  Coordination: Coordination normal          Vital Signs  ED Triage Vitals   Temperature Pulse Respirations Blood Pressure SpO2   03/05/21 2143 03/05/21 2143 03/05/21 2143 03/05/21 2143 03/05/21 2143   98 °F (36 7 °C) (!) 124 20 169/78 99 %      Temp Source Heart Rate Source Patient Position - Orthostatic VS BP Location FiO2 (%)   03/05/21 2143 03/05/21 2245 03/05/21 2245 03/05/21 2245 --   Oral Monitor Lying Right arm       Pain Score       --                  Vitals:    03/05/21 2143 03/05/21 2245   BP: 169/78 136/83   Pulse: (!) 124 101   Patient Position - Orthostatic VS:  Lying         Visual Acuity      ED Medications  Medications - No data to display    Diagnostic Studies  Results Reviewed     None                 No orders to display              Procedures  Procedures         ED Course           MDM    See hpi    The patient was instructed to follow up as documented  Strict return precautions were discussed with the patient and the patient was instructed to return to the emergency department immediately if symptoms worsen  The patient/patient family member acknowledged and were in agreement with plan  Disposition  Final diagnoses:   Palpitations     Time reflects when diagnosis was documented in both MDM as applicable and the Disposition within this note     Time User Action Codes Description Comment    3/5/2021 11:07 PM Becky Villanueva Add [R00 2] Palpitations       ED Disposition     ED Disposition Condition Date/Time Comment    Discharge Stable Fri Mar 5, 2021 11:07 PM Mc Overcast discharge to home/self care              Follow-up Information     Follow up With Specialties Details Why Contact Info Additional [facility] Call in 1 day For follow up regarding your symptoms and recheck [phone] [facility] [address] [facility], [address] [place] [address], [address] [place] [address], [address] [place] [address]          Discharge Medication List as of 3/5/2021 11:08 PM      CONTINUE these medications which have NOT CHANGED    Details   ALPRAZolam (XANAX) 0 5 mg tablet Take 0 5 mg by mouth 2 (two) times a day as needed, Starting Tue 9/10/2019, Historical Med      flunisolide (NASALIDE) 25 MCG/ACT (0 025%) SOLN flunisolide 25 mcg (0 025 %) nasal spray, Historical Med      Multiple Vitamins-Minerals (MULTIVITAMIN ADULT PO) 1 DAILY, Historical Med           No discharge procedures on file      PDMP Review     None          ED Provider  Electronically Signed by           Darryl Barnes MD  03/06/21 0482

## 2021-03-07 ENCOUNTER — TELEPHONE (OUTPATIENT)
Dept: OTHER | Facility: OTHER | Age: 51
End: 2021-03-07

## 2021-03-07 NOTE — TELEPHONE ENCOUNTER
[phone]/ PT:  Fátima Alcocer 1970/Patient is experiencing right shoulder pain  Patient stated she cannot life her arm and her shoulder is also in pain

## 2021-03-08 DIAGNOSIS — M25.511 ACUTE PAIN OF RIGHT SHOULDER: Primary | ICD-10-CM

## 2021-03-09 ENCOUNTER — OFFICE VISIT (OUTPATIENT)
Dept: OBGYN CLINIC | Facility: CLINIC | Age: 51
End: 2021-03-09
Payer: COMMERCIAL

## 2021-03-09 ENCOUNTER — LAB (OUTPATIENT)
Dept: LAB | Facility: HOSPITAL | Age: 51
End: 2021-03-09
Attending: EMERGENCY MEDICINE
Payer: COMMERCIAL

## 2021-03-09 ENCOUNTER — APPOINTMENT (OUTPATIENT)
Dept: RADIOLOGY | Facility: CLINIC | Age: 51
End: 2021-03-09
Payer: COMMERCIAL

## 2021-03-09 VITALS
BODY MASS INDEX: 30.51 KG/M2 | SYSTOLIC BLOOD PRESSURE: 143 MMHG | HEART RATE: 89 BPM | HEIGHT: 62 IN | DIASTOLIC BLOOD PRESSURE: 87 MMHG | WEIGHT: 165.8 LBS

## 2021-03-09 DIAGNOSIS — M75.01 ADHESIVE CAPSULITIS OF RIGHT SHOULDER: Primary | ICD-10-CM

## 2021-03-09 DIAGNOSIS — M25.511 ACUTE PAIN OF RIGHT SHOULDER: ICD-10-CM

## 2021-03-09 DIAGNOSIS — I47.9 TACHYCARDIA, PAROXYSMAL (HCC): Primary | ICD-10-CM

## 2021-03-09 DIAGNOSIS — R00.2 INTERMITTENT PALPITATIONS: ICD-10-CM

## 2021-03-09 DIAGNOSIS — M75.31 CALCIFIC TENDONITIS OF RIGHT SHOULDER: ICD-10-CM

## 2021-03-09 LAB
CREAT 24H UR-MRATE: 1.7 G/24HR (ref 0.6–1.8)
SPECIMEN VOL UR: 3150 ML

## 2021-03-09 PROCEDURE — 36415 COLL VENOUS BLD VENIPUNCTURE: CPT

## 2021-03-09 PROCEDURE — 84585 ASSAY OF URINE VMA: CPT

## 2021-03-09 PROCEDURE — 82384 ASSAY THREE CATECHOLAMINES: CPT

## 2021-03-09 PROCEDURE — 1036F TOBACCO NON-USER: CPT | Performed by: ORTHOPAEDIC SURGERY

## 2021-03-09 PROCEDURE — 99213 OFFICE O/P EST LOW 20 MIN: CPT | Performed by: ORTHOPAEDIC SURGERY

## 2021-03-09 PROCEDURE — 83835 ASSAY OF METANEPHRINES: CPT

## 2021-03-09 PROCEDURE — 20610 DRAIN/INJ JOINT/BURSA W/O US: CPT | Performed by: ORTHOPAEDIC SURGERY

## 2021-03-09 PROCEDURE — 3008F BODY MASS INDEX DOCD: CPT | Performed by: ORTHOPAEDIC SURGERY

## 2021-03-09 PROCEDURE — 82570 ASSAY OF URINE CREATININE: CPT

## 2021-03-09 PROCEDURE — 73030 X-RAY EXAM OF SHOULDER: CPT

## 2021-03-09 RX ORDER — METHYLPREDNISOLONE ACETATE 40 MG/ML
2 INJECTION, SUSPENSION INTRA-ARTICULAR; INTRALESIONAL; INTRAMUSCULAR; SOFT TISSUE
Status: COMPLETED | OUTPATIENT
Start: 2021-03-09 | End: 2021-03-09

## 2021-03-09 RX ORDER — BUPIVACAINE HYDROCHLORIDE 2.5 MG/ML
2 INJECTION, SOLUTION INFILTRATION; PERINEURAL
Status: COMPLETED | OUTPATIENT
Start: 2021-03-09 | End: 2021-03-09

## 2021-03-09 RX ADMIN — METHYLPREDNISOLONE ACETATE 2 ML: 40 INJECTION, SUSPENSION INTRA-ARTICULAR; INTRALESIONAL; INTRAMUSCULAR; SOFT TISSUE at 10:12

## 2021-03-09 RX ADMIN — BUPIVACAINE HYDROCHLORIDE 2 ML: 2.5 INJECTION, SOLUTION INFILTRATION; PERINEURAL at 10:12

## 2021-03-09 NOTE — PROGRESS NOTES
Assessment/Plan:  1  Adhesive capsulitis of right shoulder  Ambulatory referral to Physical Therapy   2  Calcific tendonitis of right shoulder     3  Acute pain of right shoulder  Ambulatory referral to Physical Therapy     Scribe Attestation    I,:  Bertrand Harley am acting as a scribe while in the presence of the attending physician :       I,:  Logan Xavier MD personally performed the services described in this documentation    as scribed in my presence :         R shoulder adhesive capsulitis, calcific tendinitis    · Sohan Chi is suffering with adhesive capsulitis and also has underlying calcific tendonitis  · Initiate formal physical therapy to restore ROM  Referral given  · Offered subacromial corticosteroid injection for symptomatic relief  Discussed risks and benefits and patient elected to proceed  Administered as documented below and well tolerated  · Follow up in 4 weeks  Consider intra-articular injection for persistent stiffness  Large joint arthrocentesis: R subacromial bursa  Universal Protocol:  Consent: Verbal consent obtained  Risks and benefits: risks, benefits and alternatives were discussed  Consent given by: patient  Patient understanding: patient states understanding of the procedure being performed  Site marked: the operative site was marked  Radiology Images displayed and confirmed   If images not available, report reviewed: imaging studies available  Patient identity confirmed: verbally with patient    Supporting Documentation  Indications: pain   Procedure Details  Location: shoulder - R subacromial bursa  Preparation: Patient was prepped and draped in the usual sterile fashion  Needle size: 22 G  Ultrasound guidance: no  Approach: posterolateral  Medications administered: 2 mL bupivacaine 0 25 %; 2 mL methylPREDNISolone acetate 40 mg/mL    Patient tolerance: patient tolerated the procedure well with no immediate complications  Dressing:  Sterile dressing applied          Subjective: Initial evaluation for right shoulder pain    Patient ID: Mc Simon is a 48 y o  female  who presents today for initial evaluation of right shoulder pain  She describes worsening right shoulder pain that has been occurring over the last week  She also notes decreased range of motion about her right shoulder  She describes a sharp pain about the lateral aspect of the shoulder that is worse when moving her elbow away from her body and attempting to reach out in front of her  She has had difficulty with activities of daily living such as showering and dressing  She did present to the emergency department for this pain where she had an extensive cardiac workup which was unremarkable  She denies any acute injury or trauma  She denies any distal paresthesias of the upper extremity  Review of Systems   Constitutional: Negative for activity change, chills, fever and unexpected weight change  HENT: Negative for hearing loss, nosebleeds and sore throat  Eyes: Negative for pain, redness and visual disturbance  Respiratory: Negative for cough, shortness of breath and wheezing  Cardiovascular: Negative for chest pain, palpitations and leg swelling  Gastrointestinal: Negative for abdominal pain, nausea and vomiting  Endocrine: Negative for polydipsia and polyuria  Genitourinary: Negative for dysuria and hematuria  Musculoskeletal: Positive for arthralgias  Negative for joint swelling and myalgias  See HPI   Skin: Negative for rash and wound  Neurological: Negative for dizziness, numbness and headaches  Psychiatric/Behavioral: Negative for decreased concentration and suicidal ideas  The patient is not nervous/anxious  History reviewed  No pertinent past medical history      Past Surgical History:   Procedure Laterality Date     SECTION      FOOT SURGERY      HERNIA REPAIR      LIPOSUCTION         Family History   Problem Relation Age of Onset    COPD Mother     Diabetes Mother     Thyroid disease Paternal Aunt     Diabetes Maternal Grandmother        Social History     Occupational History    Not on file   Tobacco Use    Smoking status: Never Smoker    Smokeless tobacco: Never Used   Substance and Sexual Activity    Alcohol use: Yes     Frequency: Monthly or less     Drinks per session: 1 or 2     Binge frequency: Never     Comment: socially    Drug use: Never    Sexual activity: Not on file         Current Outpatient Medications:     ALPRAZolam (XANAX) 0 5 mg tablet, Take 0 5 mg by mouth 2 (two) times a day as needed, Disp: , Rfl: 0    flunisolide (NASALIDE) 25 MCG/ACT (0 025%) SOLN, flunisolide 25 mcg (0 025 %) nasal spray, Disp: , Rfl:     Multiple Vitamins-Minerals (MULTIVITAMIN ADULT PO), 1 DAILY, Disp: , Rfl:     Allergies   Allergen Reactions    Diphenhydramine Tachycardia     Other reaction(s): Unknown  Other reaction(s): panic attack, swelling, hives      Amoxicillin Hives and Rash       Objective:  Vitals:    03/09/21 0920   BP: 143/87   Pulse: 89       Body mass index is 30 33 kg/m²  Right Shoulder Exam     Tenderness   The patient is experiencing no tenderness  Range of Motion   Right shoulder active abduction: 45  Right shoulder passive abduction: 90  Right shoulder external rotation: 30  Right shoulder forward flexion: 60  Internal rotation 0 degrees: Sacrum     Muscle Strength   Abduction: 5/5   Internal rotation: 5/5   External rotation: 5/5     Tests   Impingement: positive    Other   Erythema: absent  Scars: absent  Sensation: normal  Pulse: present            Physical Exam  Vitals signs and nursing note reviewed  Constitutional:       Appearance: She is well-developed  HENT:      Head: Normocephalic and atraumatic  Eyes:      General: No scleral icterus  Conjunctiva/sclera: Conjunctivae normal    Neck:      Musculoskeletal: Normal range of motion and neck supple     Cardiovascular: Rate and Rhythm: Normal rate  Pulmonary:      Effort: Pulmonary effort is normal  No respiratory distress  Musculoskeletal:      Comments: As noted in HPI   Skin:     General: Skin is warm and dry  Neurological:      Mental Status: She is alert and oriented to person, place, and time  Psychiatric:         Behavior: Behavior normal          I have personally reviewed pertinent films in PACS an interpretation is as follows:  X-ray of the right shoulder obtained on 03/09/2021 reviewed demonstrating a calcific density adjacent to the humeral head  There is no acute fracture, dislocation, lytic or blastic lesion       Kilo Melendez MD

## 2021-03-10 DIAGNOSIS — Z23 ENCOUNTER FOR IMMUNIZATION: ICD-10-CM

## 2021-03-12 LAB
DOPAMINE 24H UR-MRATE: <30 PG/ML (ref 0–48)
EPINEPH PLAS-MCNC: 46 PG/ML (ref 0–62)
NOREPINEPH PLAS-MCNC: 415 PG/ML (ref 0–874)

## 2021-03-13 LAB
METANEPH 24H UR-MRATE: 126 UG/24 HR (ref 36–209)
METANEPHS 24H UR-MCNC: 40 UG/L
NORMETANEPHRINE 24H UR-MCNC: 94 UG/L
NORMETANEPHRINE 24H UR-MRATE: 296 UG/24 HR (ref 131–612)

## 2021-03-16 ENCOUNTER — EVALUATION (OUTPATIENT)
Dept: PHYSICAL THERAPY | Facility: CLINIC | Age: 51
End: 2021-03-16
Payer: COMMERCIAL

## 2021-03-16 DIAGNOSIS — M75.01 ADHESIVE CAPSULITIS OF RIGHT SHOULDER: ICD-10-CM

## 2021-03-16 DIAGNOSIS — M25.511 ACUTE PAIN OF RIGHT SHOULDER: Primary | ICD-10-CM

## 2021-03-16 LAB
VMA 24H UR-MRATE: 4.7 MG/24 HR (ref 0–7.5)
VMA UR-MCNC: 1.5 MG/L

## 2021-03-16 PROCEDURE — 97112 NEUROMUSCULAR REEDUCATION: CPT

## 2021-03-16 PROCEDURE — 97161 PT EVAL LOW COMPLEX 20 MIN: CPT

## 2021-03-16 NOTE — PROGRESS NOTES
PT Evaluation     Today's date: 3/16/2021  Patient name: Alex Maynard  : 1970  MRN: [id number]  Referring provider: Rayne Vega MD  Dx:   Encounter Diagnosis     ICD-10-CM    1  Acute pain of right shoulder  M25 511 Ambulatory referral to Physical Therapy   2  Adhesive capsulitis of right shoulder  M75 01 Ambulatory referral to Physical Therapy                  Assessment  Assessment details: Alex Maynard is a pleasant 48 y o  female who presents with R shoulder pain  Pt demonstrates minor limitations in AROM and PROM with PROM being slightly greater than active  Pt demonstrates rolled shoulder and forward head posture contributing to limited shoulder AROM as her motion improved with posture correction and scapular positional cueing  Pt also demonstrated improved motion and tolerance to overhead ROM following self thoracic mobilization as her thoracic and lower cervical spine are hypomobile  Pt demonstrated full and equal strength B/L with no MMT reproducing pain  Pt's posterior glenohumeral joint capsule was hypomobile contributing to lack of IR  The patient's greatest concerns are the pain she is experiencing, worry over not knowing what's wrong, concern at no signs of improvement, fear of not being able to keep active and future ill health (and wanting to prevent it)  No further referral appears necessary at this time based upon examination results  Primary movement impairment diagnosis of scapular dyskinesis with thoracic hypomobility resulting in pathoanatomical symptoms of pain with UE movement, decreased overhead ROM and limiting her ability to lift, reach overhead and wash behind the back  Primary Impairments:  1) Poor posture  2) Scapular diskinesis  3) Thoracic hypomobility    Etiologic factors include none recalled by the patient      Impairments: abnormal or restricted ROM, abnormal movement, activity intolerance, lacks appropriate home exercise program, pain with function, scapular dyskinesis and poor body mechanics    Symptom irritability: moderateUnderstanding of Dx/Px/POC: good   Prognosis: good  Prognosis details: Positive prognostic indicators include positive attitude toward recovery, good understanding of diagnosis and treatment plan options, acuity of symptoms, absence of peripheralization and absence of observed red flags  Negative prognostic indicators include chronicity of symptoms, anxiety, hypertension, high symptom irritability and obesity  Goals  Short Term Goals   Pt will improve UE ROM by 5-10 degrees in all deficient planes order to improve function with reaching and ADLs  Pt will improve thoracic mobility to [facility] to improve UE motion  Pt will decrease pain to 4/10 at its worst  Pt will be independent with a basic HEP    Long Term Goals  Pt will achieve a FOTO score of (to be completed next visit)  Pt will improve UE ROM to [facility] in order to maximize function  Pt will improve scapulo-humeral rhythm to [facility] in order to maximize ADLs and overhead function  Pt will be able to manage symptoms independently  Pt will be independent with an extensive HEP      Plan  Patient would benefit from: skilled physical therapy  Referral necessary: No  Planned modality interventions: Modalities PRN  Planned therapy interventions: activity modification, manual therapy, neuromuscular re-education, patient education, therapeutic activities, therapeutic exercise, graded activity, home exercise program, behavior modification and self care  Frequency: 2x week  Duration in weeks: 8  Treatment plan discussed with: patient        Subjective Evaluation    History of Present Illness  Mechanism of injury: History of Current Injury: Pt reports a few weeks ago she wasn't able to move her arm  Pt reports she got a deep tissue massage a few days prior to this occurrence  Pt also reports that she did a shoulder workout as well   Pt reports that after this her should was sore however that night she was in tremendous pain that made her go to the ER  Pt reports she received a cortisone shot which helped a lot  Pt reports that she also has graves disease which may be a contributor  Pt reports multiple life stressors  Pain location/Descriptors: Feels "stuck" and somewhat sharp at the anterior shoulder  Aggravating factors: overhead movements, across body movements such as putting on deodorant, sleeping on her R side  Easing factors: cortisone shot  AM/PM pattern: no pattern  Special Questions: Pt reported numbness down her arm that hasn't occurred in a week or so however this is typical in both arms  Patient concerns: That her shoulder freezes back up  Occupation: Real estate            Not a recurrent problem   Quality of life: good    Pain  Current pain ratin  At best pain ratin  At worst pain rating: 10    Social Support  Lives with: spouse    Hand dominance: right          Objective     Postural Observations  Seated posture: fair  Standing posture: fair  Correction of posture: makes symptoms better    Additional Postural Observation Details  Posture correct improves UE motion     Tenderness     Right Shoulder  Tenderness in the [facility] joint and bicipital groove  Active Range of Motion   Cervical/Thoracic Spine       Cervical    Flexion:  WFL  Extension:  WFL  Left lateral flexion:  WFL  Right lateral flexion:  WFL  Left rotation:  WFL  Right rotation:  WFL    Right Shoulder   Flexion: 148 degrees   Abduction: 128 degrees   External rotation BTH: C6   Internal rotation BTB: T10     Passive Range of Motion     Right Shoulder   Flexion: 152 degrees   Abduction: 135 degrees   External rotation 90°: 90 degrees   Internal rotation 90°: 48 degrees     Scapular Mobility     Additional Scapular Mobility Details  Minor winging during forward flexion    Joint Play     Right Shoulder  Joints within functional limits are the anterior capsule and inferior capsule  Hypomobile in the posterior capsule  Hypomobile: C6, C7, T1, T2, T3, T4, T5 and T6     Pain: C6, C7, T1, T2, T3, T4 and T5     Strength/Myotome Testing     Left Shoulder     Planes of Motion   Flexion: 5   Abduction: 5   External rotation at 0°: 5   Internal rotation at 0°: 5     Isolated Muscles   Upper trapezius: 5     Right Shoulder     Planes of Motion   Flexion: 5   Abduction: 5   External rotation at 0°: 5   Internal rotation at 0°: 5     Isolated Muscles   Upper trapezius: 5     Left Elbow   Flexion: 5  Extension: 5    Right Elbow   Flexion: 5  Extension: 5    Left Wrist/Hand   Wrist extension: 5  Wrist flexion: 5  Radial deviation: 5  Ulnar deviation: 5  Thumb extension: 5    Right Wrist/Hand   Wrist extension: 5  Wrist flexion: 5  Radial deviation: 5  Ulnar deviation: 5  Thumb extension: 5    Tests     Right Shoulder   Positive Hawkin's, Neer's, painful arc and scapular relocation  Negative active compression ([place]), ULTT1, ULTT2 and ULTT4       Additional Tests Details  Day's positive for pain at [facility] joint             Precautions: Graves disease, Thyroid condition       3/16            Manuals                                                                 Neuro Re-Ed             Scapular retraction 10x5" HEP                                                                                          Ther Ex             Thoracic extension over chair 10x5" HEP            Table slides 10x5 scaption HEP                                                                                          Ther Activity                                       Gait Training                                       Modalities                                       Assessment IE, POC, Prognosis            Education HEP, POC, Prognosis

## 2021-03-18 LAB
METANEPH FREE SERPL-MCNC: 35.2 PG/ML (ref 0–88)
NORMETANEPHRINE SERPL-MCNC: 80.2 PG/ML (ref 0–136.8)

## 2021-03-20 LAB
DOPAMINE 24H UR-MRATE: 265 UG/24 HR (ref 0–510)
DOPAMINE UR-MCNC: 84 UG/L
EPINEPH 24H UR-MRATE: 3 UG/24 HR (ref 0–20)
EPINEPH UR-MCNC: 1 UG/L
NOREPINEPH 24H UR-MRATE: 50 UG/24 HR (ref 0–135)
NOREPINEPH UR-MCNC: 16 UG/L

## 2021-03-23 ENCOUNTER — OFFICE VISIT (OUTPATIENT)
Dept: PHYSICAL THERAPY | Facility: CLINIC | Age: 51
End: 2021-03-23
Payer: COMMERCIAL

## 2021-03-23 ENCOUNTER — IMMUNIZATIONS (OUTPATIENT)
Dept: FAMILY MEDICINE CLINIC | Facility: HOSPITAL | Age: 51
End: 2021-03-23

## 2021-03-23 DIAGNOSIS — Z23 ENCOUNTER FOR IMMUNIZATION: Primary | ICD-10-CM

## 2021-03-23 DIAGNOSIS — M25.511 ACUTE PAIN OF RIGHT SHOULDER: ICD-10-CM

## 2021-03-23 DIAGNOSIS — M75.01 ADHESIVE CAPSULITIS OF RIGHT SHOULDER: Primary | ICD-10-CM

## 2021-03-23 PROCEDURE — 91300 SARS-COV-2 / COVID-19 MRNA VACCINE (PFIZER-BIONTECH) 30 MCG: CPT

## 2021-03-23 PROCEDURE — 0001A SARS-COV-2 / COVID-19 MRNA VACCINE (PFIZER-BIONTECH) 30 MCG: CPT

## 2021-03-23 PROCEDURE — 97140 MANUAL THERAPY 1/> REGIONS: CPT

## 2021-03-23 PROCEDURE — 97110 THERAPEUTIC EXERCISES: CPT

## 2021-03-30 ENCOUNTER — OFFICE VISIT (OUTPATIENT)
Dept: PHYSICAL THERAPY | Facility: CLINIC | Age: 51
End: 2021-03-30
Payer: COMMERCIAL

## 2021-03-30 DIAGNOSIS — M25.511 ACUTE PAIN OF RIGHT SHOULDER: ICD-10-CM

## 2021-03-30 DIAGNOSIS — M75.01 ADHESIVE CAPSULITIS OF RIGHT SHOULDER: Primary | ICD-10-CM

## 2021-03-30 PROCEDURE — 97140 MANUAL THERAPY 1/> REGIONS: CPT

## 2021-03-30 PROCEDURE — 97110 THERAPEUTIC EXERCISES: CPT

## 2021-03-30 NOTE — PROGRESS NOTES
Daily Note     Today's date: 3/30/2021  Patient name: Jasmin Sutton  : 1970  MRN: [id number]  Referring provider: Chaparrita Pedraza MD  Dx:   Encounter Diagnosis     ICD-10-CM    1  Adhesive capsulitis of right shoulder  M75 01    2  Acute pain of right shoulder  M25 511                   Subjective: Pt reports that she is now able to sleep on her arm with less discomfort and she no longer has the debilitating pain she used to  Objective: See treatment diary below      Assessment: Tolerated treatment well having no pain with interventions  Pt demonstrated improved motion following Gr  V mobilization  Pt demonstrates limited IR that was improved following manual treatment  Pt performed prone T's and Y's well with appropriate scapular retraction following cueing  Patient demonstrated fatigue post treatment, exhibited good technique with therapeutic exercises and would benefit from continued PT      Plan: Continue per plan of care  Precautions: Graves disease, Thyroid condition       3/16 3/23 3/30          Manuals             [address] mobs  Posterior/inferior EM Posterior/inferoir EM          Thoracic P-A  EM Gr  II-III           Thoracic Gr  V HVLA supine pistol mobilization  EM EM          PROM  EM EM          Functional IR mobilization w movement   EM          Neuro Re-Ed             Scapular retraction 10x5" HEP            Prone Y's T's   2x10          High row + ER hold   x15 2" YTB                                                              Ther Ex             Thoracic extension over chair 10x5" HEP 10x5" 10x5", 10x5" following Gr   V          Table slides 10x5 scaption HEP            No moneys  2x15           UE bike  1'/1' 2'/2'          Rows  2x20 BTB x20 BlkTB          Straight arm pull down  2x20 GTB x20 GTB          B/L horizontal abduction  Ytb 2X20 YTB x20          Functional IR stretch   4x20" HEP          Ther Activity                                       Gait Training Modalities             cp  5' 5'                       Assessment IE, POC, Prognosis            Education HEP, POC, Prognosis Pola Bello

## 2021-04-06 ENCOUNTER — APPOINTMENT (OUTPATIENT)
Dept: PHYSICAL THERAPY | Facility: CLINIC | Age: 51
End: 2021-04-06
Payer: COMMERCIAL

## 2021-04-09 ENCOUNTER — OFFICE VISIT (OUTPATIENT)
Dept: PHYSICAL THERAPY | Facility: CLINIC | Age: 51
End: 2021-04-09
Payer: COMMERCIAL

## 2021-04-09 DIAGNOSIS — M25.511 ACUTE PAIN OF RIGHT SHOULDER: ICD-10-CM

## 2021-04-09 DIAGNOSIS — M75.01 ADHESIVE CAPSULITIS OF RIGHT SHOULDER: Primary | ICD-10-CM

## 2021-04-09 PROCEDURE — 97110 THERAPEUTIC EXERCISES: CPT

## 2021-04-09 PROCEDURE — 97112 NEUROMUSCULAR REEDUCATION: CPT

## 2021-04-09 PROCEDURE — 97140 MANUAL THERAPY 1/> REGIONS: CPT

## 2021-04-09 NOTE — PROGRESS NOTES
Daily Note     Today's date: 2021  Patient name: Stevenson Rodriges  : 1970  MRN: [id number]  Referring provider: Garth Trejo MD  Dx:   Encounter Diagnosis     ICD-10-CM    1  Adhesive capsulitis of right shoulder  M75 01    2  Acute pain of right shoulder  M25 511                   Subjective: Pt reports she feels about the same as she did since the last visit  Pt reports she went bowling which made her arm a little sore but she was able to do it  Objective: See treatment diary below      Assessment: Tolerated treatment well demonstrating improvement in all ROM to [facility] except a minor limitation in functional IR  Pt has normal mobility with inferior GH joint play however posterior still slightly hypomobile  Pt had no pain with interventions however had discomfort with cane flexion over towel roll  Pt had decreased difficulty with horizontal abduction and was able to perform D2 flexion without issue  Patient demonstrated fatigue post treatment, exhibited good technique with therapeutic exercises and would benefit from continued PT      Plan: Continue per plan of care  Potential discharge next visit  Precautions: Graves disease, Thyroid condition       3/16 3/23 3/30 4/9         Manuals             [facility] mobs  Posterior/inferior EM Posterior/inferoir EM Posterior EM         Thoracic P-A  EM Gr  II-III           Thoracic Gr  V HVLA supine pistol mobilization  EM EM EM upper thoracic         PROM  EM EM EM         Functional IR mobilization w movement   EM          Neuro Re-Ed             Scapular retraction 10x5" HEP            Prone Y's T's   2x10 2x10         High row + ER hold   x15 2" YTB 2x10  YTB         D2 flexion    YTB 2x15 HEP                                                Ther Ex             Thoracic extension over chair 10x5" HEP 10x5" 10x5", 10x5" following Gr   V At table 4x10" at upper thoracic         Table slides 10x5 scaption HEP            No moneys  2x15           UE bike  '' 2'/2' 2'/2'         Rows  2x20 BTB x20 BlkTB 2x20 BlkTB         Straight arm pull down  2x20 GTB x20 GTB 2x20 GTB         B/L horizontal abduction  Ytb 2X20 YTB x20 2x15 YTB HEP         Functional IR stretch   4x20" HEP 4x20"         Supine B/L cane flexion over towel roll    x10 D/C         Ther Activity                                       Gait Training                                       Modalities             cp  5' 5'                       Assessment IE, POC, Prognosis            Education HEP, POC, Prognosis Julius Cheng

## 2021-04-12 ENCOUNTER — APPOINTMENT (OUTPATIENT)
Dept: PHYSICAL THERAPY | Facility: CLINIC | Age: 51
End: 2021-04-12
Payer: COMMERCIAL

## 2021-04-13 ENCOUNTER — OFFICE VISIT (OUTPATIENT)
Dept: CARDIOLOGY CLINIC | Facility: CLINIC | Age: 51
End: 2021-04-13
Payer: COMMERCIAL

## 2021-04-13 ENCOUNTER — PROCEDURE VISIT (OUTPATIENT)
Dept: CARDIOLOGY CLINIC | Facility: CLINIC | Age: 51
End: 2021-04-13
Payer: COMMERCIAL

## 2021-04-13 ENCOUNTER — OFFICE VISIT (OUTPATIENT)
Dept: OBGYN CLINIC | Facility: CLINIC | Age: 51
End: 2021-04-13
Payer: COMMERCIAL

## 2021-04-13 VITALS
DIASTOLIC BLOOD PRESSURE: 84 MMHG | HEART RATE: 76 BPM | HEIGHT: 62 IN | SYSTOLIC BLOOD PRESSURE: 124 MMHG | BODY MASS INDEX: 29.81 KG/M2 | WEIGHT: 162 LBS

## 2021-04-13 VITALS
WEIGHT: 162 LBS | DIASTOLIC BLOOD PRESSURE: 84 MMHG | SYSTOLIC BLOOD PRESSURE: 126 MMHG | HEART RATE: 76 BPM | BODY MASS INDEX: 29.81 KG/M2 | HEIGHT: 62 IN

## 2021-04-13 DIAGNOSIS — F41.9 ANXIETY: ICD-10-CM

## 2021-04-13 DIAGNOSIS — R00.2 PALPITATIONS: ICD-10-CM

## 2021-04-13 DIAGNOSIS — M75.31 CALCIFIC TENDONITIS OF RIGHT SHOULDER: Primary | ICD-10-CM

## 2021-04-13 DIAGNOSIS — E78.5 HYPERLIPIDEMIA, UNSPECIFIED HYPERLIPIDEMIA TYPE: ICD-10-CM

## 2021-04-13 DIAGNOSIS — R00.2 PALPITATIONS: Primary | ICD-10-CM

## 2021-04-13 DIAGNOSIS — E66.3 OVERWEIGHT (BMI 25.0-29.9): ICD-10-CM

## 2021-04-13 PROCEDURE — 93246 EXT ECG>7D<15D RECORDING: CPT | Performed by: INTERNAL MEDICINE

## 2021-04-13 PROCEDURE — 99204 OFFICE O/P NEW MOD 45 MIN: CPT | Performed by: INTERNAL MEDICINE

## 2021-04-13 PROCEDURE — 3008F BODY MASS INDEX DOCD: CPT | Performed by: ORTHOPAEDIC SURGERY

## 2021-04-13 NOTE — PATIENT INSTRUCTIONS

## 2021-04-13 NOTE — PROGRESS NOTES
[facility] [id number] [phone]                                              Cardiology Office Consult  Ana Salvador, 48 y o  female  YOB: 1970  MRN: [id number] Encounter: [id number]      PCP - Ferny Gibbs MD    Assessment  1  Palpitations  2  Tachycardia  3  Hyperlipidemia  4  Anxiety  5  H/o COVID19 infection  6  H/o Graves disease  · TSH in 2015 was 0 067 (10/31/2015), but has normalized - briefly on hyperthyroid medications, and then had hypothyroidism      Plan  Palpitations/Tachycardia  · Sinus tachycardia versus SVT   · she does have some episodes that are persistent  · Reviewed the available ECGs from [facility] ED, which showed normal sinus rhythm and sinus tachycardia, but no other significant findings  · She thinks most of her severe symptoms occur once a week  · Will check Zio patch monitor for 2 weeks  · Counseled regarding need to minimize caffeine, alcohol (drinks 1 glass wine)  · She uses health supplements - "isagenix" - occasionally and "E shot" rarely  Unclear if these have significant amount of caffeine - have asked her to check and minimize same if so    Hyperlipidemia, Overweight, Body mass index is 29 63 kg/m²  · Lipid panel 11/09/2020 showed total cholesterol 234, triglycerides 103, HDL 73,    · Cholesterol has been up trending over the past 5 years  · Counseled regarding low carb, low-fat diet and increase exercise   · Weight loss to target weight of around 145-150 lb suggested   · Repeat lipid panel prior to next office visit      Orders Placed This Encounter   Procedures    Lipid Panel with Direct LDL reflex    AMB extended holter monitor       Return in about 2 months (around 6/13/2021), or if symptoms worsen or fail to improve      History of Present Illness   59-year-old female comes in as a new patient for evaluation of ongoing symptoms of palpitations / tachycardia  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  On 3/5/21,  She had an episode  Where she noted her heart rate to be consistently in the 100-1 30s  She had been monitoring her heart rate on her I watch, and had noted that her heart rate had been high many times in the 100s even at nights  She has a history of hyperthyroidism / Graves disease dating back many years, and was on treatment for the same temporary relief, but then subsequently developed hypothyroidism  She has not had any radiology in treatment or ablation for hyperthyroid  She subsequently became hypothyroid and was started on  Thyroid supplementation, with which he again became hyperthyroid  As a result she has remained off thyroid medications recently  With her  Prior thyroid problems and her tachycardia and fluctuating heart rate and blood pressure, she was concerned and as a result went to the ED for evaluation  She was found to have sinus tachycardia, underwent basic testing in the ED and was discharged to home  Since discharge, she has quit her caffeine, but has continued to have episodes of fast heart rates even at night, as noted on her iWatch,   And as a result is here for consultation regarding the same  No complains of major chest pain or shortness of breath  No complains of dizziness or lightheadedness  No history of syncope  Her brother  from a IV drug abuse late last year, and she has been under a lot of stress over the past few months  She additionally had COVID-19 infection as well late last year  Historical Information   History reviewed  No pertinent past medical history    Past Surgical History:   Procedure Laterality Date     SECTION      FOOT SURGERY      HERNIA REPAIR      LIPOSUCTION       Family History   Problem Relation Age of Onset   [facility] COPD Mother     Diabetes Mother     Thyroid disease Paternal Aunt     Diabetes Maternal Grandmother      Current Outpatient Medications on File Prior to Visit   Medication Sig Dispense Refill    ALPRAZolam (XANAX) 0 5 mg tablet Take 0 5 mg by mouth 2 (two) times a day as needed  0    flunisolide (NASALIDE) 25 MCG/ACT (0 025%) SOLN flunisolide 25 mcg (0 025 %) nasal spray      Multiple Vitamins-Minerals (MULTIVITAMIN ADULT PO) 1 DAILY       No current facility-administered medications on file prior to visit        Allergies   Allergen Reactions    Diphenhydramine Tachycardia     Other reaction(s): Unknown  Other reaction(s): panic attack, swelling, hives      Amoxicillin Hives and Rash     Social History     Socioeconomic History    Marital status: /Civil Union     Spouse name: None    Number of children: None    Years of education: None    Highest education level: None   Occupational History    None   Social Needs    Financial resource strain: None    Food insecurity     Worry: None     Inability: None    Transportation needs     Medical: None     Non-medical: None   Tobacco Use    Smoking status: Never Smoker    Smokeless tobacco: Never Used   Substance and Sexual Activity    Alcohol use: Yes     Frequency: Monthly or less     Drinks per session: 1 or 2     Binge frequency: Never     Comment: socially    Drug use: Never    Sexual activity: None   Lifestyle    Physical activity     Days per week: None     Minutes per session: None    Stress: None   Relationships    Social connections     Talks on phone: None     Gets together: None     Attends Taoism service: None     Active member of club or organization: None     Attends meetings of clubs or organizations: None     Relationship status: None    Intimate partner violence     Fear of current or ex partner: None     Emotionally abused: None     Physically abused: None     Forced sexual activity: None   Other Topics Concern    None   Social History Narrative    None        Review of Systems   All other systems reviewed and are negative  Vitals:  Vitals:    04/13/21 1436   BP: 126/84   Pulse: 76   Weight: 73 5 kg (162 lb)   Height: 5' 2" (1 575 m)     BMI - Body mass index is 29 63 kg/m²  Wt Readings from Last 7 Encounters:   04/13/21 73 5 kg (162 lb)   04/13/21 73 5 kg (162 lb)   03/09/21 75 2 kg (165 lb 12 8 oz)   03/05/21 76 7 kg (169 lb 1 5 oz)   03/05/21 76 7 kg (169 lb 1 5 oz)   06/03/20 77 6 kg (171 lb 1 2 oz)   04/28/20 74 3 kg (163 lb 12 8 oz)       Physical Exam  Vitals signs and nursing note reviewed  Constitutional:       General: She is not in acute distress  Appearance: Normal appearance  She is well-developed  She is not ill-appearing  HENT:      Head: Normocephalic and atraumatic  Nose: No congestion  Eyes:      General: No scleral icterus  Conjunctiva/sclera: Conjunctivae normal    Neck:      Musculoskeletal: Neck supple  Vascular: No carotid bruit or JVD  Cardiovascular:      Rate and Rhythm: Normal rate and regular rhythm  Pulses: Normal pulses  Heart sounds: Normal heart sounds  No murmur  No friction rub  No gallop  Pulmonary:      Effort: Pulmonary effort is normal  No respiratory distress  Breath sounds: Normal breath sounds  No rales  Abdominal:      General: There is no distension  Palpations: Abdomen is soft  Tenderness: There is no abdominal tenderness  Musculoskeletal:         General: No swelling or tenderness  Right lower leg: No edema  Left lower leg: No edema  Skin:     General: Skin is warm  Neurological:      General: No focal deficit present  Mental Status: She is alert and oriented to person, place, and time  Mental status is at baseline  Psychiatric:         Mood and Affect: Mood normal          Behavior: Behavior normal          Thought Content:  Thought content normal        Labs:  CBC:   Lab Results   Component Value Date    WBC 15 00 (H) 03/05/2021    RBC 4 42 03/05/2021    HGB 14 3 03/05/2021    HCT 42 6 03/05/2021 MCV 96 03/05/2021     03/05/2021    RDW 12 4 03/05/2021       CMP:   Lab Results   Component Value Date    K 3 8 03/05/2021     03/05/2021    CO2 23 03/05/2021    BUN 12 03/05/2021    CREATININE 0 81 03/05/2021    EGFR 85 03/05/2021    CALCIUM 9 4 03/05/2021    AST 18 03/05/2021    ALT 30 03/05/2021    ALKPHOS 82 03/05/2021       Magnesium:  Lab Results   Component Value Date    MG 2 0 03/05/2021       Lipid Profile:   No results found for: CHOL, HDL, TRIG, LDLCALC    Thyroid Studies:   Lab Results   Component Value Date    EZT2SKNWMJGD 2 143 03/05/2021    FREET4 0 80 03/05/2021    K8WBLMD 0 90 03/05/2021       No components found for: HGA1C    Lab Results   Component Value Date    INR 0 91 03/05/2021   5    Imaging: No results found  Cardiac testing:   No results found for this or any previous visit  No results found for this or any previous visit  No results found for this or any previous visit  No results found for this or any previous visit

## 2021-04-13 NOTE — PROGRESS NOTES
Assessment/Plan:  1  Calcific tendonitis of right shoulder       Scribe Attestation    I,:  Carrie Hernandez am acting as a scribe while in the presence of the attending physician :       I,:  Tena Lazo MD personally performed the services described in this documentation    as scribed in my presence :         · Manda Kenny has done very well with her treatment plan  · She may be discharged to a home exercise program  · She may continue with activity to her tolerance  · We will see her back on an as needed basis    Subjective: Follow-up evaluation for right shoulder pain    Patient ID: Nguyen Anderson is a 48 y o  female who returns today for follow-up evaluation of her right shoulder pain  At her last visit, a corticosteroid injection was administered to the subacromial bursa for symptomatic relief  She was also referred to physical therapy  At today's visit, she states that her pain has resolved  She notes near full range of motion of her shoulder, but does still feel "tightness" when reaching behind her back  She anticipates discharge from physical therapy after her next visit  She denies any new injury or trauma  Review of Systems   Constitutional: Negative for chills, fever and unexpected weight change  HENT: Negative for hearing loss, nosebleeds and sore throat  Eyes: Negative for pain, redness and visual disturbance  Respiratory: Negative for cough, shortness of breath and wheezing  Cardiovascular: Negative for chest pain, palpitations and leg swelling  Gastrointestinal: Negative for abdominal pain, nausea and vomiting  Endocrine: Negative for polydipsia and polyuria  Genitourinary: Negative for dysuria and hematuria  Musculoskeletal: Negative for arthralgias, joint swelling and myalgias  See HPI   Skin: Negative for rash and wound  Neurological: Negative for dizziness, numbness and headaches     Psychiatric/Behavioral: Negative for decreased concentration and suicidal ideas  The patient is not nervous/anxious  History reviewed  No pertinent past medical history  Past Surgical History:   Procedure Laterality Date     SECTION      FOOT SURGERY      HERNIA REPAIR      LIPOSUCTION         Family History   Problem Relation Age of Onset   [facility] COPD Mother     Diabetes Mother     Thyroid disease Paternal Aunt     Diabetes Maternal Grandmother        Social History     Occupational History    Not on file   Tobacco Use    Smoking status: Never Smoker    Smokeless tobacco: Never Used   Substance and Sexual Activity    Alcohol use: Yes     Frequency: Monthly or less     Drinks per session: 1 or 2     Binge frequency: Never     Comment: socially    Drug use: Never    Sexual activity: Not on file         Current Outpatient Medications:     ALPRAZolam (XANAX) 0 5 mg tablet, Take 0 5 mg by mouth 2 (two) times a day as needed, Disp: , Rfl: 0    flunisolide (NASALIDE) 25 MCG/ACT (0 025%) SOLN, flunisolide 25 mcg (0 025 %) nasal spray, Disp: , Rfl:     Multiple Vitamins-Minerals (MULTIVITAMIN ADULT PO), 1 DAILY, Disp: , Rfl:     Allergies   Allergen Reactions    Diphenhydramine Tachycardia     Other reaction(s): Unknown  Other reaction(s): panic attack, swelling, hives      Amoxicillin Hives and Rash       Objective:  Vitals:    21 1031   BP: 124/84   Pulse: 76       Body mass index is 29 63 kg/m²  Right Shoulder Exam     Tenderness   The patient is experiencing no tenderness  Range of Motion   Active abduction: normal   External rotation: normal   Forward flexion: normal   Internal rotation 0 degrees: normal     Muscle Strength   Abduction: 5/5   Internal rotation: 5/5   External rotation: 5/5   Supraspinatus: 5/5     Tests   Impingement: negative  Drop arm: negative    Other   Erythema: absent  Scars: absent  Sensation: normal  Pulse: present    Comments:  Empty can: negative            Physical Exam  Vitals signs and nursing note reviewed  Constitutional:       Appearance: She is well-developed  HENT:      Head: Normocephalic and atraumatic  Eyes:      General: No scleral icterus  Conjunctiva/sclera: Conjunctivae normal    Neck:      Musculoskeletal: Normal range of motion and neck supple  Cardiovascular:      Rate and Rhythm: Normal rate  Pulmonary:      Effort: Pulmonary effort is normal  No respiratory distress  Musculoskeletal:      Comments: As noted in HPI   Skin:     General: Skin is warm and dry  Neurological:      Mental Status: She is alert and oriented to person, place, and time     Psychiatric:         Behavior: Behavior normal

## 2021-04-14 ENCOUNTER — IMMUNIZATIONS (OUTPATIENT)
Dept: FAMILY MEDICINE CLINIC | Facility: HOSPITAL | Age: 51
End: 2021-04-14

## 2021-04-14 DIAGNOSIS — Z23 ENCOUNTER FOR IMMUNIZATION: Primary | ICD-10-CM

## 2021-04-14 PROCEDURE — 91300 SARS-COV-2 / COVID-19 MRNA VACCINE (PFIZER-BIONTECH) 30 MCG: CPT

## 2021-04-14 PROCEDURE — 0002A SARS-COV-2 / COVID-19 MRNA VACCINE (PFIZER-BIONTECH) 30 MCG: CPT

## 2021-04-16 ENCOUNTER — EVALUATION (OUTPATIENT)
Dept: PHYSICAL THERAPY | Facility: CLINIC | Age: 51
End: 2021-04-16
Payer: COMMERCIAL

## 2021-04-16 DIAGNOSIS — M75.01 ADHESIVE CAPSULITIS OF RIGHT SHOULDER: Primary | ICD-10-CM

## 2021-04-16 DIAGNOSIS — M25.511 ACUTE PAIN OF RIGHT SHOULDER: ICD-10-CM

## 2021-04-16 PROCEDURE — 97110 THERAPEUTIC EXERCISES: CPT

## 2021-04-16 PROCEDURE — 97140 MANUAL THERAPY 1/> REGIONS: CPT

## 2021-04-16 PROCEDURE — 97112 NEUROMUSCULAR REEDUCATION: CPT

## 2021-04-16 NOTE — PROGRESS NOTES
PT Re-evaluation     Today's date: 2021  Patient name: Yanelis Lyn  : 1970  MRN: [id number]  Referring provider: Katherin Camarena MD  Dx:   Encounter Diagnosis     ICD-10-CM    1  Adhesive capsulitis of right shoulder  M75 01    2  Acute pain of right shoulder  M25 511                   Subjective: No new complaints today  The patient reports improvement in symptoms since previous session  The patient reports 0/10 pain at it's worst over the past 24 hours, and reports 100% improvement in overall condition since beginning formal PT care  Objective: See treatment diary below    Tenderness     Right Shoulder  Tenderness in the [facility] joint and bicipital groove  Active Range of Motion   Cervical/Thoracic Spine       Cervical    Flexion:  WFL  Extension:  WFL  Left lateral flexion:  WFL  Right lateral flexion:  WFL  Left rotation:  WFL  Right rotation:  WFL    Right Shoulder   Flexion: 158 degrees   Abduction: 178 degrees   External rotation BTH: T6   Internal rotation BTB: T10     Passive Range of Motion     Right Shoulder   Flexion: 170 degrees   Abduction: 168 degrees   External rotation 90°: 90 degrees   Internal rotation 90°: 55 degrees     Scapular Mobility     Additional Scapular Mobility Details  Minor winging during forward flexion    Joint Play     Right Shoulder  Joints within functional limits are the anterior capsule and inferior capsule  Slight Hypomobility still noted in the posterior capsule       Hypomobile: C6, C7, T1, T2, T3, T4, T5 and T6     Pain: C6, C7, T1, T2, T3, T4 and T5     Strength/Myotome Testing     Left Shoulder     Planes of Motion   Flexion: 5   Abduction: 5   External rotation at 0°: 5   Internal rotation at 0°: 5     Isolated Muscles   Upper trapezius: 5     Right Shoulder     Planes of Motion   Flexion: 5   Abduction: 5   External rotation at 0°: 5   Internal rotation at 0°: 5     Isolated Muscles   Upper trapezius: 5     Left Elbow   Flexion: 5  Extension: 5    Right Elbow   Flexion: 5  Extension: 5    Left Wrist/Hand   Wrist extension: 5  Wrist flexion: 5  Radial deviation: 5  Ulnar deviation: 5  Thumb extension: 5    Right Wrist/Hand   Wrist extension: 5  Wrist flexion: 5  Radial deviation: 5  Ulnar deviation: 5  Thumb extension: 5    Tests     Right Shoulder   Negative Hawkin's, Neer's, painful arc and scapular relocation  Negative active compression (Sawyer), ULTT1, ULTT2 and ULTT4  Additional Tests Details  Day's positive for pain at [facility] joint      Assessment: Brooke Santillan is a pleasant 48 y o  female who has been receiving PT intervention for R shoulder pain  The patient has demonstrated decreased pain, increased strength, increased ROM, increased joint mobility, improved body mechanics, improved posture  and increased activity tolerance since beginning treatment  Pt demonstrates improved scapular mechanics and positioning during overhead movement being able to achieve full and equal motion without pain  Pt still has hypomobility of her lower cervical spine however thoracic spine has demonstrated mild improvement with springing and reported stiffness  Pt was educated to continue to work on scapular strengthening and she felt confident that she could continue on her own        Primary remaining impairments include:    1)  Slight hypermobility in the posterior capsule and cervical/thorcacic spine    Goals  Short Term Goals   Pt will improve UE ROM by 5-10 degrees in all deficient planes order to improve function with reaching and ADLs - MET  Pt will improve thoracic mobility to [facility] to improve UE motion  - MET  Pt will decrease pain to 4/10 at its worst - MET  Pt will be independent with a basic HEP - MET    Long Term Goals  Pt will achieve a FOTO score of 74  - MET   Pt will improve UE ROM to [facility] in order to maximize function - MET  Pt will improve scapulo-humeral rhythm to [facility] in order to maximize ADLs and overhead function - MET  Pt will be able to manage symptoms independently - MET  Pt will be independent with an extensive HEP - MET    Plan: The patient has met or exceeded her short & long term goals and is a candidate for discharge from formal PT care to an independent home program        Precautions: Graves disease, Thyroid condition       3/16 3/23 3/30 4/9         Manuals             [facility] mobs  Posterior/inferior EM Posterior/inferoir EM Posterior EM posterior EM        Thoracic P-A  EM Gr  II-III   EM        Thoracic Gr  V HVLA supine pistol mobilization  EM EM EM upper thoracic EM upper thoracic        PROM  EM EM EM EM        Functional IR mobilization w movement   EM          Neuro Re-Ed             Scapular retraction 10x5" HEP            Prone Y's T's   2x10 2x10         High row + ER hold   x15 2" YTB 2x10  YTB 2x10 YTB        D2 flexion    YTB 2x15 HEP YTB 2x15                                               Ther Ex             Thoracic extension over chair 10x5" HEP 10x5" 10x5", 10x5" following Gr   V At table 4x10" at upper thoracic         Table slides 10x5 scaption HEP            No moneys  2x15           UE bike  1'/1' 2'/2' 2'/2' 2'/2'        Rows  2x20 BTB x20 BlkTB 2x20 BlkTB         Straight arm pull down  2x20 GTB x20 GTB 2x20 GTB         B/L horizontal abduction  Ytb 2X20 YTB x20 2x15 YTB HEP         Functional IR stretch   4x20" HEP 4x20"         Supine B/L cane flexion over towel roll    x10 D/C         Doorway pec stretch     4x20" HEP        Ther Activity                                       Gait Training                                       Modalities             cp  5' 5'                       Assessment IE, POC, Prognosis    Re-evaluatoin        Education HEP, POC, Prognosis UHEP UHEP Rafal Banuelos

## 2021-04-20 ENCOUNTER — APPOINTMENT (OUTPATIENT)
Dept: PHYSICAL THERAPY | Facility: CLINIC | Age: 51
End: 2021-04-20
Payer: COMMERCIAL

## 2021-04-23 ENCOUNTER — APPOINTMENT (OUTPATIENT)
Dept: PHYSICAL THERAPY | Facility: CLINIC | Age: 51
End: 2021-04-23
Payer: COMMERCIAL

## 2021-04-27 ENCOUNTER — APPOINTMENT (OUTPATIENT)
Dept: PHYSICAL THERAPY | Facility: CLINIC | Age: 51
End: 2021-04-27
Payer: COMMERCIAL

## 2021-04-30 ENCOUNTER — APPOINTMENT (OUTPATIENT)
Dept: PHYSICAL THERAPY | Facility: CLINIC | Age: 51
End: 2021-04-30
Payer: COMMERCIAL

## 2021-05-12 ENCOUNTER — CLINICAL SUPPORT (OUTPATIENT)
Dept: CARDIOLOGY CLINIC | Facility: CLINIC | Age: 51
End: 2021-05-12
Payer: COMMERCIAL

## 2021-05-12 DIAGNOSIS — R00.2 PALPITATIONS: ICD-10-CM

## 2021-05-12 PROCEDURE — 93248 EXT ECG>7D<15D REV&INTERPJ: CPT | Performed by: INTERNAL MEDICINE

## 2021-05-13 DIAGNOSIS — R00.2 PALPITATIONS: Primary | ICD-10-CM

## 2021-05-13 RX ORDER — BISOPROLOL FUMARATE 5 MG/1
5 TABLET ORAL DAILY
Qty: 30 TABLET | Refills: 5 | Status: SHIPPED | OUTPATIENT
Start: 2021-05-13 | End: 2021-11-23

## 2021-05-13 NOTE — TELEPHONE ENCOUNTER
Mallorie Portillo MD  P  [facility]             No significant arrhythmias  Only 1 of the symptomatic episodes correlated with sinus tachycardia   Rest were normal  If still quite symptomatic, can consider beta-blockers - bisoprolol 5 mg

## 2021-06-22 ENCOUNTER — OFFICE VISIT (OUTPATIENT)
Dept: CARDIOLOGY CLINIC | Facility: CLINIC | Age: 51
End: 2021-06-22
Payer: COMMERCIAL

## 2021-06-22 VITALS
BODY MASS INDEX: 29.81 KG/M2 | HEART RATE: 62 BPM | HEIGHT: 62 IN | DIASTOLIC BLOOD PRESSURE: 78 MMHG | SYSTOLIC BLOOD PRESSURE: 112 MMHG | WEIGHT: 162 LBS

## 2021-06-22 DIAGNOSIS — E78.5 HYPERLIPIDEMIA, UNSPECIFIED HYPERLIPIDEMIA TYPE: ICD-10-CM

## 2021-06-22 DIAGNOSIS — F41.9 ANXIETY: ICD-10-CM

## 2021-06-22 DIAGNOSIS — R00.2 PALPITATIONS: Primary | ICD-10-CM

## 2021-06-22 DIAGNOSIS — E66.3 OVERWEIGHT (BMI 25.0-29.9): ICD-10-CM

## 2021-06-22 PROCEDURE — 3008F BODY MASS INDEX DOCD: CPT | Performed by: INTERNAL MEDICINE

## 2021-06-22 PROCEDURE — 99213 OFFICE O/P EST LOW 20 MIN: CPT | Performed by: INTERNAL MEDICINE

## 2021-06-22 PROCEDURE — 1036F TOBACCO NON-USER: CPT | Performed by: INTERNAL MEDICINE

## 2021-06-22 NOTE — PROGRESS NOTES
[facility] [place]  [facility] [id number] [phone]                                              Cardiology Office Follow up  Odalys Patino, 48 y o  female  YOB: 1970  MRN: [id number] Encounter: [id number]      PCP - Brian Portillo MD    Assessment  1  Palpitations  2  Tachycardia  3  Hyperlipidemia  4  Anxiety  5  H/o COVID19 infection  6  H/o Graves disease  · TSH in 2015 was 0 067 (10/31/2015), but has normalized - briefly on hyperthyroid medications, and then had hypothyroidism      Plan  Palpitations/Tachycardia  · Sinus tachycardia versus SVT   · she does have some episodes that are persistent  · Reviewed the available ECGs from [facility] ED, which showed normal sinus rhythm and sinus tachycardia, but no other significant findings  ·  Zio patch monitor did not show any significant arrhythmias, and infrequent ectopy  Some of her symptoms during that correlated with sinus tachycardia  · I prescribed her bisoprolol 5 mg help with symptomatic/inappropriate sinus tachycardia,  But her symptoms have subsequently improved, and she has not needed to take any of the same   · She feels well over the past couple of months  · Okay to use bisoprolol 5 mg p r n  if she has any persistent palpitations  · Increase exercise    Hyperlipidemia, Overweight, Body mass index is 29 63 kg/m²  · Lipid panel 11/09/2020 showed total cholesterol 234, triglycerides 103, HDL 73,    · Cholesterol has been up trending over the past 5 years  ·  repeat lipid panel is still pending   · Counseled regarding weight loss and exercise  · She is doing some static exercises, but I have encouraged her to do more dynamic exercises with walking/ running/ swimming in order to help with weight loss  · Target weight around 150 lb    No results found for this visit on 06/22/21  No orders of the defined types were placed in this encounter      Return in about 6 months (around 2021), or if symptoms worsen or fail to improve  History of Present Illness   70-year-old female comes in as a new patient for evaluation of ongoing symptoms of palpitations / tachycardia  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  On 3/5/21,  She had an episode  Where she noted her heart rate to be consistently in the 100-1 30s  She had been monitoring her heart rate on her I watch, and had noted that her heart rate had been high many times in the 100s even at nights  She has a history of hyperthyroidism / Graves disease dating back many years, and was on treatment for the same temporary relief, but then subsequently developed hypothyroidism  She has not had any radiology in treatment or ablation for hyperthyroid  She subsequently became hypothyroid and was started on  Thyroid supplementation, with which he again became hyperthyroid  As a result she has remained off thyroid medications recently  With her  Prior thyroid problems and her tachycardia and fluctuating heart rate and blood pressure, she was concerned and as a result went to the ED for evaluation  She was found to have sinus tachycardia, underwent basic testing in the ED and was discharged to home  Since discharge, she has quit her caffeine, but has continued to have episodes of fast heart rates even at night, as noted on her iWatch,   And as a result is here for consultation regarding the same  No complains of major chest pain or shortness of breath  No complains of dizziness or lightheadedness  No history of syncope  Her brother  from a IV drug abuse late last year, and she has been under a lot of stress over the past few months  She additionally had COVID-19 infection as well late last year  Interval history - 2021  She comes back for follow-up after completing his are patch monitor  She did have some symptoms during this, most of which was the initial episode  But  Rhythm tracings at these times did not reveal any significant arrhythmias  Some for symptoms correlated with sinus tachycardia, and as a result she was given bisoprolol trial, but she started to feel better and has not needed to use any of it  Historical Information   History reviewed  No pertinent past medical history  Past Surgical History:   Procedure Laterality Date     SECTION      FOOT SURGERY      HERNIA REPAIR      LIPOSUCTION       Family History   Problem Relation Age of Onset   Flako Joe COPD Mother     Diabetes Mother     Thyroid disease Paternal Aunt     Diabetes Maternal Grandmother      Current Outpatient Medications on File Prior to Visit   Medication Sig Dispense Refill    ALPRAZolam (XANAX) 0 5 mg tablet Take 0 5 mg by mouth 2 (two) times a day as needed  0    flunisolide (NASALIDE) 25 MCG/ACT (0 025%) SOLN flunisolide 25 mcg (0 025 %) nasal spray      Multiple Vitamins-Minerals (MULTIVITAMIN ADULT PO) 1 DAILY      bisoprolol (ZEBETA) 5 mg tablet Take 1 tablet (5 mg total) by mouth daily (Patient not taking: Reported on 2021) 30 tablet 5     No current facility-administered medications on file prior to visit       Allergies   Allergen Reactions    Diphenhydramine Tachycardia     Other reaction(s): Unknown  Other reaction(s): panic attack, swelling, hives      Amoxicillin Hives and Rash     Social History     Socioeconomic History    Marital status: /Civil Union     Spouse name: None    Number of children: None    Years of education: None    Highest education level: None   Occupational History    None   Tobacco Use    Smoking status: Never Smoker    Smokeless tobacco: Never Used   Vaping Use    Vaping Use: Never used   Substance and Sexual Activity    Alcohol use: Yes     Comment: socially    Drug use: Never    Sexual activity: None   Other Topics Concern    None   Social History Narrative    None     Social Determinants of Health     Financial Resource Strain:     Difficulty of Paying Living Expenses:    Food Insecurity:     Worried About Running Out of Food in the Last Year:     [address] in the Last Year:    Transportation Needs:     Lack of Transportation (Medical):  Lack of Transportation (Non-Medical):    Physical Activity:     Days of Exercise per Week:     Minutes of Exercise per Session:    Stress:     Feeling of Stress :    Social Connections:     Frequency of Communication with Friends and Family:     Frequency of Social Gatherings with Friends and Family:     Attends Faith Services:     Active Member of Clubs or Organizations:     Attends Club or Organization Meetings:     Marital Status:    Intimate Partner Violence:     Fear of Current or Ex-Partner:     Emotionally Abused:     Physically Abused:     Sexually Abused:         Review of Systems   All other systems reviewed and are negative  Vitals:  Vitals:    06/22/21 1405   BP: 112/78   Pulse: 62   Weight: 73 5 kg (162 lb)   Height: 5' 2" (1 575 m)     BMI - Body mass index is 29 63 kg/m²  Wt Readings from Last 7 Encounters:   06/22/21 73 5 kg (162 lb)   04/13/21 73 5 kg (162 lb)   04/13/21 73 5 kg (162 lb)   03/09/21 75 2 kg (165 lb 12 8 oz)   03/05/21 76 7 kg (169 lb 1 5 oz)   03/05/21 76 7 kg (169 lb 1 5 oz)   06/03/20 77 6 kg (171 lb 1 2 oz)       Physical Exam  Vitals and nursing note reviewed  Constitutional:       General: She is not in acute distress  Appearance: Normal appearance  She is well-developed  She is obese  She is not ill-appearing  HENT:      Head: Normocephalic and atraumatic  Nose: No congestion  Eyes:      General: No scleral icterus  Conjunctiva/sclera: Conjunctivae normal    Neck:      Vascular: No carotid bruit or JVD  Cardiovascular:      Rate and Rhythm: Normal rate and regular rhythm  Pulses: Normal pulses  Heart sounds: Normal heart sounds  No murmur heard  No friction rub  No gallop  Pulmonary:      Effort: Pulmonary effort is normal  No respiratory distress  Breath sounds: Normal breath sounds  No rales  Abdominal:      General: There is no distension  Palpations: Abdomen is soft  Tenderness: There is no abdominal tenderness  Musculoskeletal:         General: No swelling or tenderness  Cervical back: Neck supple  Right lower leg: No edema  Left lower leg: No edema  Skin:     General: Skin is warm  Neurological:      General: No focal deficit present  Mental Status: She is alert and oriented to person, place, and time  Mental status is at baseline  Psychiatric:         Mood and Affect: Mood normal          Behavior: Behavior normal          Thought Content: Thought content normal        Labs:  CBC:   Lab Results   Component Value Date    WBC 15 00 (H) 03/05/2021    RBC 4 42 03/05/2021    HGB 14 3 03/05/2021    HCT 42 6 03/05/2021    MCV 96 03/05/2021     03/05/2021    RDW 12 4 03/05/2021       CMP:   Lab Results   Component Value Date    K 3 8 03/05/2021     03/05/2021    CO2 23 03/05/2021    BUN 12 03/05/2021    CREATININE 0 81 03/05/2021    EGFR 85 03/05/2021    CALCIUM 9 4 03/05/2021    AST 18 03/05/2021    ALT 30 03/05/2021    ALKPHOS 82 03/05/2021       Magnesium:  Lab Results   Component Value Date    MG 2 0 03/05/2021       Lipid Profile:   No results found for: CHOL, HDL, TRIG, LDLCALC    Thyroid Studies:   Lab Results   Component Value Date    HGH4BWQVMQNK 2 143 03/05/2021    FREET4 0 80 03/05/2021    M8XFYJB 0 90 03/05/2021       No components found for: HGA1C    Lab Results   Component Value Date    INR 0 91 03/05/2021   5    Imaging: No results found  Cardiac testing:   No results found for this or any previous visit  No results found for this or any previous visit  No results found for this or any previous visit  No results found for this or any previous visit

## 2021-11-23 ENCOUNTER — OFFICE VISIT (OUTPATIENT)
Dept: OBGYN CLINIC | Facility: CLINIC | Age: 51
End: 2021-11-23
Payer: COMMERCIAL

## 2021-11-23 VITALS
BODY MASS INDEX: 30.62 KG/M2 | HEIGHT: 61 IN | WEIGHT: 162.2 LBS | DIASTOLIC BLOOD PRESSURE: 83 MMHG | HEART RATE: 84 BPM | SYSTOLIC BLOOD PRESSURE: 144 MMHG

## 2021-11-23 DIAGNOSIS — M18.12 ARTHRITIS OF CARPOMETACARPAL (CMC) JOINT OF LEFT THUMB: Primary | ICD-10-CM

## 2021-11-23 PROCEDURE — 20600 DRAIN/INJ JOINT/BURSA W/O US: CPT | Performed by: PHYSICIAN ASSISTANT

## 2021-11-23 PROCEDURE — 99214 OFFICE O/P EST MOD 30 MIN: CPT | Performed by: PHYSICIAN ASSISTANT

## 2021-11-23 RX ORDER — TRIAMCINOLONE ACETONIDE 40 MG/ML
20 INJECTION, SUSPENSION INTRA-ARTICULAR; INTRAMUSCULAR
Status: COMPLETED | OUTPATIENT
Start: 2021-11-23 | End: 2021-11-23

## 2021-11-23 RX ORDER — LIDOCAINE HYDROCHLORIDE 10 MG/ML
2.5 INJECTION, SOLUTION INFILTRATION; PERINEURAL
Status: COMPLETED | OUTPATIENT
Start: 2021-11-23 | End: 2021-11-23

## 2021-11-23 RX ADMIN — Medication 0.05 MEQ: at 09:24

## 2021-11-23 RX ADMIN — LIDOCAINE HYDROCHLORIDE 2.5 ML: 10 INJECTION, SOLUTION INFILTRATION; PERINEURAL at 09:24

## 2021-11-23 RX ADMIN — TRIAMCINOLONE ACETONIDE 20 MG: 40 INJECTION, SUSPENSION INTRA-ARTICULAR; INTRAMUSCULAR at 09:24

## 2021-12-20 PROCEDURE — 88305 TISSUE EXAM BY PATHOLOGIST: CPT | Performed by: PATHOLOGY

## 2021-12-21 ENCOUNTER — OFFICE VISIT (OUTPATIENT)
Dept: CARDIOLOGY CLINIC | Facility: CLINIC | Age: 51
End: 2021-12-21
Payer: COMMERCIAL

## 2021-12-21 ENCOUNTER — LAB REQUISITION (OUTPATIENT)
Dept: LAB | Facility: HOSPITAL | Age: 51
End: 2021-12-21
Payer: COMMERCIAL

## 2021-12-21 VITALS
HEIGHT: 61 IN | DIASTOLIC BLOOD PRESSURE: 80 MMHG | HEART RATE: 71 BPM | BODY MASS INDEX: 30.4 KG/M2 | SYSTOLIC BLOOD PRESSURE: 110 MMHG | WEIGHT: 161 LBS

## 2021-12-21 DIAGNOSIS — Z12.11 ENCOUNTER FOR SCREENING FOR MALIGNANT NEOPLASM OF COLON: ICD-10-CM

## 2021-12-21 DIAGNOSIS — E66.09 CLASS 1 OBESITY DUE TO EXCESS CALORIES WITHOUT SERIOUS COMORBIDITY WITH BODY MASS INDEX (BMI) OF 30.0 TO 30.9 IN ADULT: ICD-10-CM

## 2021-12-21 DIAGNOSIS — K63.5 POLYP OF COLON: ICD-10-CM

## 2021-12-21 DIAGNOSIS — E78.5 HYPERLIPIDEMIA, UNSPECIFIED HYPERLIPIDEMIA TYPE: ICD-10-CM

## 2021-12-21 DIAGNOSIS — R00.2 PALPITATIONS: Primary | ICD-10-CM

## 2021-12-21 PROCEDURE — 93000 ELECTROCARDIOGRAM COMPLETE: CPT | Performed by: INTERNAL MEDICINE

## 2021-12-21 PROCEDURE — 99213 OFFICE O/P EST LOW 20 MIN: CPT | Performed by: INTERNAL MEDICINE

## 2022-06-02 ENCOUNTER — TELEPHONE (OUTPATIENT)
Dept: OBGYN CLINIC | Facility: MEDICAL CENTER | Age: 52
End: 2022-06-02

## 2022-06-02 NOTE — TELEPHONE ENCOUNTER
Patient calling back, she needs to change the appointment scheduled for 6/8 with Dr Adria Zarate  Hand specialist will call back        [phone]

## 2022-06-22 ENCOUNTER — OFFICE VISIT (OUTPATIENT)
Dept: OBGYN CLINIC | Facility: CLINIC | Age: 52
End: 2022-06-22
Payer: COMMERCIAL

## 2022-06-22 VITALS
SYSTOLIC BLOOD PRESSURE: 121 MMHG | HEART RATE: 72 BPM | WEIGHT: 168 LBS | HEIGHT: 62 IN | RESPIRATION RATE: 16 BRPM | BODY MASS INDEX: 30.91 KG/M2 | DIASTOLIC BLOOD PRESSURE: 76 MMHG

## 2022-06-22 DIAGNOSIS — M18.12 ARTHRITIS OF CARPOMETACARPAL (CMC) JOINT OF LEFT THUMB: Primary | ICD-10-CM

## 2022-06-22 PROCEDURE — 99214 OFFICE O/P EST MOD 30 MIN: CPT | Performed by: SURGERY

## 2022-06-22 PROCEDURE — 20600 DRAIN/INJ JOINT/BURSA W/O US: CPT | Performed by: SURGERY

## 2022-06-22 RX ORDER — TRIAMCINOLONE ACETONIDE 40 MG/ML
20 INJECTION, SUSPENSION INTRA-ARTICULAR; INTRAMUSCULAR
Status: COMPLETED | OUTPATIENT
Start: 2022-06-22 | End: 2022-06-22

## 2022-06-22 RX ORDER — BUPIVACAINE HYDROCHLORIDE 2.5 MG/ML
0.5 INJECTION, SOLUTION INFILTRATION; PERINEURAL
Status: COMPLETED | OUTPATIENT
Start: 2022-06-22 | End: 2022-06-22

## 2022-06-22 RX ADMIN — TRIAMCINOLONE ACETONIDE 20 MG: 40 INJECTION, SUSPENSION INTRA-ARTICULAR; INTRAMUSCULAR at 12:58

## 2022-06-22 RX ADMIN — BUPIVACAINE HYDROCHLORIDE 0.5 ML: 2.5 INJECTION, SOLUTION INFILTRATION; PERINEURAL at 12:58

## 2022-06-22 NOTE — PROGRESS NOTES
Cydney VIVEROS  Attending, Orthopaedic Surgery  Hand, Wrist, and Elbow Surgery  [facility]      ORTHOPAEDIC HAND, WRIST, AND ELBOW OFFICE  VISIT       ASSESSMENT/PLAN:      47 yo female with left CMC arthritis  X-rays reviewed in office today  We discussed treatment options for her left CMC arthritis including continued steroid injections versus surgical intervention  Patient is still getting about 6 months of relief from the steroid injections  We discussed that it is reasonable to continue with injections until they no longer provide significant relief  Patient is agreeable and underwent a repeat steroid injection today  Comfort Cool brace was provided along with Voltaren gel to use as needed  We will see her back in about 3-6 months for repeat injections as needed    The patient verbalized understanding of exam findings and treatment plan  We engaged in the shared decision-making process and treatment options were discussed at length with the patient  Surgical and conservative management discussed today along with risks and benefits  Diagnoses and all orders for this visit:    Arthritis of carpometacarpal Yancey) joint of left thumb  -     Diclofenac Sodium (VOLTAREN) 1 %; Apply 2 g topically 4 (four) times a day  -     Durable Medical Equipment  -     Small joint arthrocentesis        Follow Up:  Return if symptoms worsen or fail to improve  To Do Next Visit:  Re-evaluation of current issue      General Discussions:  [facility] Arthritis: The anatomy and physiology of carpometacarpal joint arthritis was discussed with the patient today in the office  Deterioration of the articular cartilage eventually leads to hypermobility at the thumb [facility] joint, resulting in joint subluxation, osteophyte formation, cystic changes within the trapezium and base of the first metacarpal, as well as subchondral sclerosis    Eventually, pain, limited mobility, and compensatory hyperextension at the metacarpophalangeal joint may develop  While normal activity and usage of the thumb joint may provide a painful experience to the patient, this typically does not result in damage to the thumb or hand  Treatment options include resting thumb spica splints to decreased joint edema, pain, and inflammation  Therapy exercises to strengthen the thenar musculature may relieve pain, but do not alter the overall continued development of osteoarthritis  Oral medications, topical medications, corticosteroid injections may decrease pain and increase overall function  Eventually, approximately 5% of patients may require surgical intervention  ____________________________________________________________________________________________________________________________________________      CHIEF COMPLAINT:  Chief Complaint   Patient presents with    Left Thumb - Pain       SUBJECTIVE:  Mary Kendrick is a 46y o  year old RHD female who presents for evaluation of left thumb CMC arthritis  She has been seen for this issue since 2019 and received several injections in this area which provide her about 6 months of relief  She states the pain started when she bumped the area on something, no other trauma  No paresthesias, no clicking or locking  No fever or chills  Pain/symptom timing:  Worse during the day when active  Pain/symptom context:  Worse with activites and work  Pain/symptom modifying factors:  Rest makes better, activities make worse  Pain/symptom associated signs/symptoms: none    Prior treatment   · NSAIDsYes   · Injections Yes   · Bracing/Orthotics No    Physical Therapy No     I have personally reviewed all the relevant PMH, PSH, SH, FH, Medications and allergies      PAST MEDICAL HISTORY:  History reviewed   No pertinent past medical history  PAST SURGICAL HISTORY:  Past Surgical History:   Procedure Laterality Date     SECTION      FOOT SURGERY      HERNIA REPAIR      LIPOSUCTION         FAMILY HISTORY:  Family History   Problem Relation Age of Onset   Junius Webster COPD Mother     Diabetes Mother     Thyroid disease Paternal Aunt     Diabetes Maternal Grandmother        SOCIAL HISTORY:  Social History     Tobacco Use    Smoking status: Never Smoker    Smokeless tobacco: Never Used   Vaping Use    Vaping Use: Never used   Substance Use Topics    Alcohol use: Yes     Comment: socially    Drug use: Never       MEDICATIONS:    Current Outpatient Medications:     ALPRAZolam (XANAX) 0 5 mg tablet, Take 0 5 mg by mouth 2 (two) times a day as needed, Disp: , Rfl: 0    Diclofenac Sodium (VOLTAREN) 1 %, Apply 2 g topically 4 (four) times a day, Disp: 150 g, Rfl: 1    flunisolide (NASALIDE) 25 MCG/ACT (0 025%) SOLN, flunisolide 25 mcg (0 025 %) nasal spray, Disp: , Rfl:     Multiple Vitamins-Minerals (MULTIVITAMIN ADULT PO), 1 DAILY, Disp: , Rfl:     ALLERGIES:  Allergies   Allergen Reactions    Diphenhydramine Tachycardia     Other reaction(s): Unknown  Other reaction(s): panic attack, swelling, hives      Amoxicillin Hives and Rash           REVIEW OF SYSTEMS:  Review of Systems   Constitutional: Negative for chills, fatigue and fever  HENT: Negative for hearing loss, nosebleeds and sore throat  Eyes: Negative for redness and visual disturbance  Respiratory: Negative for cough, shortness of breath and wheezing  Cardiovascular: Negative for chest pain, palpitations and leg swelling  Gastrointestinal: Negative for abdominal pain, nausea and vomiting  Endocrine: Negative for polydipsia and polyuria  Genitourinary: Negative for difficulty urinating, dysuria and hematuria  Musculoskeletal: Positive for arthralgias  Negative for joint swelling and myalgias  Skin: Negative for rash and wound  Neurological: Negative for speech difficulty, weakness, numbness and headaches  Psychiatric/Behavioral: Negative for decreased concentration and suicidal ideas  The patient is not nervous/anxious  VITALS:  Vitals:    06/22/22 1230   BP: 121/76   Pulse: 72   Resp: 16       LABS:  HgA1c: No results found for: HGBA1C  BMP:   Lab Results   Component Value Date    CALCIUM 9 4 03/05/2021    K 3 8 03/05/2021    CO2 23 03/05/2021     03/05/2021    BUN 12 03/05/2021    CREATININE 0 81 03/05/2021       _____________________________________________________  PHYSICAL EXAMINATION:  General: well developed and well nourished, alert, oriented times 3 and appears comfortable  Psychiatric: Normal  HEENT: Normocephalic, Atraumatic Trachea Midline, No torticollis  Pulmonary: No audible wheezing or respiratory distress   Abdomen/GI: Non tender, non distended   Cardiovascular: No pitting edema, 2+ radial pulse   Skin: No masses, erythema, lacerations, fluctation, ulcerations  Neurovascular: Sensation Intact to the Median, Ulnar, Radial Nerve, Motor Intact to the Median, Ulnar, Radial Nerve and Pulses Intact  Musculoskeletal: Normal, except as noted in detailed exam and in HPI        MUSCULOSKELETAL EXAMINATION:  Left CMC Exam:  No adduction contracture  No hyperextension deformity of MCP joint  Positive localized tenderness over radial and dorsal aspect of thumb (CMC joint)  Grind test is Negative for pain and Negative for crepitus  Metacarpal load shift test positive for pain   No triggering or tenderness over the A1 pulley  No pain with Finkelsteins maneuver     ___________________________________________________  STUDIES REVIEWED:  I have personally reviewed AP lateral and oblique radiographs of left hand which demonstrate moderate to severe CMC joint arthritis with subchondral cyst formation            PROCEDURES PERFORMED:  Small joint arthrocentesis: L thumb CMC  [place] Protocol:  Consent: Verbal consent obtained  Risks and benefits: risks, benefits and alternatives were discussed  Consent given by: patient  Time out: Immediately prior to procedure a "time out" was called to verify the correct patient, procedure, equipment, support staff and site/side marked as required  Patient understanding: patient states understanding of the procedure being performed  Site marked: the operative site was marked  Radiology Images displayed and confirmed   If images not available, report reviewed: imaging studies available  Required items: required blood products, implants, devices, and special equipment available  Patient identity confirmed: verbally with patient    Supporting Documentation  Indications: pain   Procedure Details  Location: thumb - L thumb CMC  Needle size: 25 G  Ultrasound guidance: no  Approach: dorsal  Medications administered: 20 mg triamcinolone acetonide 40 mg/mL; 0 5 mL bupivacaine 0 25 %    Patient tolerance: patient tolerated the procedure well with no immediate complications  Dressing:  Sterile dressing applied             _____________________________________________________      Scribe Attestation    I,:  Devonte York PA-C am acting as a scribe while in the presence of the attending physician :       I,:  Ronan Vanessa MD personally performed the services described in this documentation    as scribed in my presence :

## 2022-12-25 NOTE — PROGRESS NOTES
Daily Note     Today's date: 3/23/2021  Patient name: Madie Borges  : 1970  MRN: [id number]  Referring provider: Swetha Abad MD  Dx: No diagnosis found  Subjective: Pt reports that she hasn't really had much pain recently and has felt pretty good  Pt was 15 minutes late, PT accommodated  Objective: See treatment diary below      Assessment: Tolerated treatment well having no pain with interventions with only muscle fatigue  Pt demonstrates near full ROM with the exception of minor limitation in functional IR  Pt consented to Gr  V HVLA thoracic mobilization and responded well  Pt has minor hypomobility in her posterior GH capsule likely contributing to her limited IR  Pt was given an updated HEP to work on scapular strengthening and was advised to perform thoracic extension exercises first before strengthening exercises  Patient demonstrated fatigue post treatment, exhibited good technique with therapeutic exercises and would benefit from continued PT      Plan: Continue per plan of care        Precautions: Graves disease, Thyroid condition       3/16 3/23           Manuals             [address] mobs  Posterior/inferior EM           Thoracic P-A  EM Gr  II-III           Thoracic Gr  V HVLA supine pistol mobilization  EM           PROM  EM           Neuro Re-Ed             Scapular retraction 10x5" HEP            Prone I's, Y's T's                                                                              Ther Ex             Thoracic extension over chair 10x5" HEP 10x5"           Table slides 10x5 scaption HEP            No moneys  2x15"           UE bike  1'/1'           Rows  2x20 BTB           Straight arm pull down  2x20 GTB           B/L horizontal abduction  Ytb 2X20                        Ther Activity                                       Gait Training                                       Modalities             cp  5'                        Assessment IE, POC, Prognosis [facility] Consult Note           Reason for Consult:  End stage kidney disease  Requesting Physician:  Dr. Meño Guzman    Chief Complaint:  Shortness of breath  History Obtained From:  patient, electronic medical record    History of Present Ilness:    47year old male with ESKD on HD MWF admitted with shortness of breath. We have been asked to assist in further dialysis care. States his HD unit was closed on Friday & did not reschedule over weekend. Progressively has become more short of breath. Found to be in Afib with RVR on presentation, requiring bi-pap.  K 6.1, treated medically in ER.  +weak, no fevers.     Past Medical History:        Diagnosis Date    Ambulatory dysfunction     walker for long distances, SOB with distance    Aortic stenosis     echo 2017    Arthritis     hands and hips    Asthma     Bilateral hilar adenopathy syndrome 6/3/2013    CAD (coronary artery disease)     Dr. Blanka Johnson [facility]) 04/19/2019    EF= 43%    CHF (congestive heart failure) ([address]     Chronic pain     COPD (chronic obstructive pulmonary disease) ([address]     pulmonology Dr. Elda Jacobs    Depression     Diabetes mellitus ([address]     borderline    Difficult intravenous access     Emphysema of lung ([address]     ESRD (end stage renal disease) on dialysis ([address]     MWF    Fear of needles     Gastric ulcer     GERD (gastroesophageal reflux disease)     Heart valve problem     bicuspic valve    Hemodialysis patient ([address]     History of spinal fracture     work incident    Hx of blood clots     Bilateral lower extremities; stents in place    Hyperlipidemia     Hypertension     MI (myocardial infarction) ([address] 2019    has had 9 MIs. 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  2/29/2015    WN    CORONARY ANGIOPLASTY WITH STENT PLACEMENT  05/26/15    CYST REMOVAL  08/14/2013    EXCISION CYSTS, NECK X2 AND ABDOMINAL benign    DIAGNOSTIC CARDIAC CATH LAB PROCEDURE      DIALYSIS FISTULA CREATION Left 10/30/2017    LEFT BRACHIAL CEPHALIC FISTULA    DIALYSIS FISTULA CREATION Left 3/27/2019    LIGATION  AV FISTULA performed by Carri Burrell MD at [id number] [place] [address], COLON, DIAGNOSTIC      IR TUNNELED CATHETER PLACEMENT GREATER THAN 5 YEARS  3/21/2022    IR TUNNELED CATHETER PLACEMENT GREATER THAN 5 YEARS 3/21/2022 [facility]    IR TUNNELED CATHETER PLACEMENT GREATER THAN 5 YEARS  4/21/2022    IR TUNNELED CATHETER PLACEMENT GREATER THAN 5 YEARS 4/21/2022 [facility]    IR TUNNELED CATHETER PLACEMENT GREATER THAN 5 YEARS  4/26/2022    IR TUNNELED CATHETER PLACEMENT GREATER THAN 5 YEARS 4/26/2022 [facility]    IR TUNNELED CATHETER PLACEMENT GREATER THAN 5 YEARS  6/23/2022    IR TUNNELED CATHETER PLACEMENT GREATER THAN 5 YEARS 6/23/2022 [facility]    OTHER SURGICAL HISTORY  02/01/2017    laparoscopic cholecystectomy with intraoperative cholangiogram    OTHER SURGICAL HISTORY  2018    PORT PLACEMENT  - vas cath    OTHER SURGICAL HISTORY Bilateral 06/26/2018    laprascopic peritoneal dialysis catheter placement    OTHER SURGICAL HISTORY Right 09/2018    peritoneal dialysis port placed on right side of abdomen    OTHER SURGICAL HISTORY  05/28/2019    PTA/Stenting R External Iliac artery    CT LAP INSERTION TUNNELED INTRAPERITONEAL CATHETER N/A 9/21/2018    LAPAROSCOPIC PERITONEAL DIALYSIS CATHETER REPLACEMENT performed by Ari Majano MD at [address] [facility] [address] 2/24/2022    PERINEAL ABCESS DRAINAGE performed by Ari Majano MD at [address] [place] [address] N/A 10/2/2022    THORACENTESIS ULTRASOUND performed by Kelley Frank MD at [address] [place] [address]  01/06/2016    UPPER GASTROINTESTINAL ENDOSCOPY  01/29/2017    possible candida, otherwise normal appearing    VASCULAR SURGERY  aprx 2 years ago    2 stents placed, each side of groin       Home Medications:    No current facility-administered medications on file prior to encounter. Current Outpatient Medications on File Prior to Encounter   Medication Sig Dispense Refill    docusate sodium (DOK) 100 MG capsule Take 1 capsule by mouth as needed for Constipation      sennosides-docusate sodium (SENOKOT-S) 8.6-50 MG tablet Take 1 tablet by mouth as needed for Constipation      apixaban (ELIQUIS) 2.5 MG TABS tablet Take 1 tablet by mouth 2 times daily 60 tablet 0    gabapentin (NEURONTIN) 100 MG capsule Take 2 capsules by mouth 3 times daily as needed (neuropathic pain) for up to 10 days.  60 capsule 0    metoprolol succinate (TOPROL XL) 100 MG extended release tablet Take 1 tablet by mouth in the morning and at bedtime 30 tablet 3    cyclobenzaprine (FLEXERIL) 5 MG tablet Muscle spasms      QUEtiapine (SEROQUEL) 50 MG tablet TAKE 1 TABLET BY MOUTH EVERY EVENING 30 tablet 10    isosorbide dinitrate (ISORDIL) 20 MG tablet Take 1 tablet by mouth 3 times daily 90 tablet 3    clopidogrel (PLAVIX) 75 MG tablet Take 1 tablet by mouth daily 90 tablet 3    pantoprazole (PROTONIX) 40 MG tablet TAKE 1 TABLET BY MOUTH EVERY MORNING BEFORE BREAKFAST 30 tablet 10    DULoxetine (CYMBALTA) 30 MG extended release capsule TAKE 1 CAPSULE BY MOUTH EVERY DAY 30 capsule 10    pravastatin (PRAVACHOL) 40 MG tablet Take 1 tablet by mouth daily 90 tablet 3    B Complex-C-Folic Acid (VIRT-CAPS) 1 MG CAPS TAKE 1 CAPSULE BY MOUTH EVERY DAY 90 capsule 1    Calcium Acetate, Phos Binder, 667 MG CAPS TAKE 1 CAPSULE BY MOUTH THREE TIMES DAILY WITH MEALS 90 capsule 3    nitroGLYCERIN (NITROSTAT) 0.4 MG SL tablet DISSOLVE 1 TABLET UNDER THE TONGUE AS NEEDED FOR CHEST PAIN EVERY 5 MINUTES UP TO 3 TIMES. IF NO RELIEF CALL 911. 25 tablet 10    vitamin D (ERGOCALCIFEROL) 06477 units CAPS capsule TK 1 C PO WEEKLY  11    Alcohol Swabs PADS USE AS DIRECTED 300 each 3    ipratropium-albuterol (DUONEB) 0.5-2.5 (3) MG/3ML SOLN nebulizer solution Inhale 3 mLs into the lungs every 6 hours as needed for Shortness of Breath 360 mL 1    calcium carbonate (TUMS) 500 MG chewable tablet Take 1 tablet by mouth 3 times daily as needed for Heartburn.          Allergies:  Morphine    Social History:    Social History     Socioeconomic History    Marital status:      Spouse name: Not on file    Number of children: Not on file    Years of education: Not on file    Highest education level: Not on file   Occupational History    Not on file   Tobacco Use    Smoking status: Former     Packs/day: 0.50     Years: 33.00     Pack years: 16.50     Types: Cigarettes     Quit date: 2020     Years since quittin.6    Smokeless tobacco: Never    Tobacco comments:     [facility] quit 2021   Vaping Use    Vaping Use: Never used   Substance and Sexual Activity    Alcohol use: Not Currently     Alcohol/week: 0.0 standard drinks     Comment: occ    Drug use: No    Sexual activity: Yes     Partners: Female     Comment:    Other Topics Concern    Not on file   Social History Narrative    Not on file     Social Determinants of Health     Financial Resource Strain: Not on file   Food Insecurity: Not on file   Transportation Needs: Not on file   Physical Activity: Not on file   Stress: Not on file   Social Connections: Not on file   Intimate Partner Violence: Not on file   Housing Stability: Not on file       Family History:   Family History   Problem Relation Age of Onset    Diabetes Mother     Heart Disease Father     Kidney Disease Sister         stage 4-kidney failure    Cancer Sister     Heart Disease Sister     Obesity Sister     Cancer Sister     Heart Disease Sister     Obesity Sister     Alcohol Abuse Brother        Review of Systems:   Pertinent positives stated above in HPI. Remainder of 10 point review of systems were reviewed and were negative.     Physical exam:   Constitutional:  VITALS:  Temp 98.3 °F (36.8 °C) (Oral)   Wt 232 lb 9.4 oz (105.5 kg)   BMI 34.35 kg/m²   Gen: alert, awake, ill-appearing  Skin: no rash, turgor wnl  Heent:  eomi, mmm  Neck: no bruits or jvd noted, thyroid normal  Cardiovascular:  S1, S2, irregular without m/r/g  Respiratory: Coarse breath sounds B without w/r/r; respiratory effort normal  Abdomen:  +bs, soft, nt, nd, no hepatosplenomegaly  Ext: no lower extremity edema  Access: [facility]  Psychiatric: mood and affect appropriate; judgement and insight intact  Musculoskeletal:  Rom, muscular strength limited; digits, nails normal    Data/  CBC:   Lab Results   Component Value Date/Time    WBC 16.0 12/25/2022 05:00 AM    RBC 2.88 12/25/2022 05:00 AM    HGB 8.6 12/25/2022 05:00 AM    HCT 25.9 12/25/2022 05:00 AM    MCV 90.1 12/25/2022 05:00 AM    MCH 29.9 12/25/2022 05:00 AM    MCHC 33.1 12/25/2022 05:00 AM    RDW 16.6 12/25/2022 05:00 AM     12/25/2022 05:00 AM    MPV 7.4 12/25/2022 05:00 AM     BMP:    Lab Results   Component Value Date/Time     12/25/2022 05:00 AM    K 6.1 12/25/2022 05:00 AM    CL 91 12/25/2022 05:00 AM    CO2 22 12/25/2022 05:00 AM     12/25/2022 05:00 AM    LABALBU 3.8 12/25/2022 05:00 AM    CREATININE 9.0 12/25/2022 05:00 AM    CALCIUM 8.6 12/25/2022 05:00 AM    GFRAA 10 10/05/2022 11:53 AM    GFRAA >60 05/17/2013 06:50 AM    LABGLOM 6 12/25/2022 05:00 AM    GLUCOSE 340 12/25/2022 05:00 AM         Assessment/    - End stage kidney disease - on HD Mon-Wed-Fri    - ?PNA versus fluid overload    - Hyperkalemia    - Atrial fibrillation      Plan/    - Start CRRT with -150 ml/hour as tolerated for now  - Plan for regular HD tomorrow, EDW 99 kg  - Dose DAVON with HD  - Trend labs, bp's    Case d/w ICU team    Thank you for the consultation. Please do not hesitate to call with questions. ____________________________________  Cameron Rodriguez MD  [facility]  [email or web address]  Office: [phone]

## 2023-01-15 ENCOUNTER — HOSPITAL ENCOUNTER (EMERGENCY)
Facility: HOSPITAL | Age: 53
Discharge: HOME | End: 2023-01-15
Attending: EMERGENCY MEDICINE

## 2023-01-15 VITALS
HEART RATE: 116 BPM | WEIGHT: 168 LBS | TEMPERATURE: 99.8 F | SYSTOLIC BLOOD PRESSURE: 148 MMHG | BODY MASS INDEX: 30.91 KG/M2 | RESPIRATION RATE: 18 BRPM | DIASTOLIC BLOOD PRESSURE: 77 MMHG | OXYGEN SATURATION: 97 % | HEIGHT: 62 IN

## 2023-01-15 DIAGNOSIS — J11.1 INFLUENZA: Primary | ICD-10-CM

## 2023-01-15 LAB
FLUAV RNA RESP QL NAA+PROBE: POSITIVE
FLUBV RNA RESP QL NAA+PROBE: NEGATIVE
RSV RNA RESP QL NAA+PROBE: NEGATIVE
SARS-COV-2 RNA RESP QL NAA+PROBE: NEGATIVE

## 2023-01-15 RX ORDER — IBUPROFEN 600 MG/1
600 TABLET ORAL ONCE
Status: COMPLETED | OUTPATIENT
Start: 2023-01-15 | End: 2023-01-15

## 2023-01-15 RX ORDER — ONDANSETRON 4 MG/1
4 TABLET, ORALLY DISINTEGRATING ORAL EVERY 6 HOURS PRN
Qty: 20 TABLET | Refills: 0 | Status: SHIPPED | OUTPATIENT
Start: 2023-01-15

## 2023-01-15 RX ORDER — ONDANSETRON 2 MG/ML
4 INJECTION INTRAMUSCULAR; INTRAVENOUS ONCE
Status: COMPLETED | OUTPATIENT
Start: 2023-01-15 | End: 2023-01-15

## 2023-01-15 RX ADMIN — IBUPROFEN 600 MG: 600 TABLET, FILM COATED ORAL at 19:18

## 2023-01-15 RX ADMIN — SODIUM CHLORIDE 1000 ML: 0.9 INJECTION, SOLUTION INTRAVENOUS at 19:16

## 2023-01-15 RX ADMIN — ONDANSETRON 4 MG: 2 INJECTION INTRAMUSCULAR; INTRAVENOUS at 19:15

## 2023-01-15 NOTE — ED PROVIDER NOTES
History  Chief Complaint   Patient presents with   • Flu Symptoms     Fever at home of 102, nausea, body aches and loss of appetite that started yesterday  Son is recently sick  1 day of flu sxs  Headache, nausea, myalgias, anorexia  Prior to Admission Medications   Prescriptions Last Dose Informant Patient Reported? Taking? ALPRAZolam (XANAX) 0 5 mg tablet   Yes No   Sig: Take 0 5 mg by mouth 2 (two) times a day as needed   Diclofenac Sodium (VOLTAREN) 1 %   No No   Sig: Apply 2 g topically 4 (four) times a day   Multiple Vitamins-Minerals (MULTIVITAMIN ADULT PO)   Yes No   Si DAILY   flunisolide (NASALIDE) 25 MCG/ACT (0 025%) SOLN   Yes No   Sig: flunisolide 25 mcg (0 025 %) nasal spray      Facility-Administered Medications: None       Past Medical History:   Diagnosis Date   • Disease of thyroid gland        Past Surgical History:   Procedure Laterality Date   •  SECTION     • FOOT SURGERY     • HERNIA REPAIR     • LIPOSUCTION         Family History   Problem Relation Age of Onset   • COPD Mother    • Diabetes Mother    • Thyroid disease Paternal Aunt    • Diabetes Maternal Grandmother      I have reviewed and agree with the history as documented  E-Cigarette/Vaping   • E-Cigarette Use Never User      E-Cigarette/Vaping Substances   • Nicotine No    • THC No    • CBD No    • Flavoring No    • Other No    • Unknown No      Social History     Tobacco Use   • Smoking status: Never   • Smokeless tobacco: Never   Vaping Use   • Vaping Use: Never used   Substance Use Topics   • Alcohol use: Yes     Comment: socially   • Drug use: Never       Review of Systems   Constitutional: Positive for fever  Gastrointestinal: Positive for nausea  Musculoskeletal: Positive for myalgias  Neurological: Positive for headaches  All other systems reviewed and are negative  Physical Exam  Physical Exam  Vitals and nursing note reviewed     Constitutional:       General: She is not in acute distress  Appearance: Normal appearance  She is well-developed  She is not ill-appearing, toxic-appearing or diaphoretic  HENT:      Head: Normocephalic and atraumatic  Eyes:      Conjunctiva/sclera: Conjunctivae normal       Pupils: Pupils are equal, round, and reactive to light  Neck:      Vascular: No JVD  Cardiovascular:      Rate and Rhythm: Regular rhythm  Tachycardia present  Pulses: Normal pulses  Heart sounds: Normal heart sounds  No murmur heard  Pulmonary:      Effort: Pulmonary effort is normal  No respiratory distress  Breath sounds: Normal breath sounds  No stridor  No wheezing, rhonchi or rales  Abdominal:      General: There is no distension  Palpations: Abdomen is soft  Tenderness: There is no abdominal tenderness  There is no guarding or rebound  Musculoskeletal:         General: No tenderness or deformity  Normal range of motion  Cervical back: Normal range of motion and neck supple  No rigidity  Skin:     General: Skin is warm and dry  Capillary Refill: Capillary refill takes less than 2 seconds  Coloration: Skin is not pale  Findings: No erythema or rash  Neurological:      General: No focal deficit present  Mental Status: She is alert and oriented to person, place, and time  Cranial Nerves: No cranial nerve deficit  Sensory: No sensory deficit  Motor: No weakness or abnormal muscle tone        Coordination: Coordination normal          Vital Signs  ED Triage Vitals   Temperature Pulse Respirations Blood Pressure SpO2   01/15/23 1835 01/15/23 1835 01/15/23 1835 01/15/23 1835 01/15/23 1835   99 8 °F (37 7 °C) (!) 116 18 148/77 97 %      Temp Source Heart Rate Source Patient Position - Orthostatic VS BP Location FiO2 (%)   01/15/23 1835 01/15/23 1835 01/15/23 1835 01/15/23 1835 --   Temporal Monitor Sitting Left arm       Pain Score       01/15/23 1918       7           Vitals:    01/15/23 1835   BP: 148/77 Pulse: (!) 116   Patient Position - Orthostatic VS: Sitting         Visual Acuity      ED Medications  Medications   ibuprofen (MOTRIN) tablet 600 mg (600 mg Oral Given 1/15/23 1918)   sodium chloride 0 9 % bolus 1,000 mL (0 mL Intravenous Stopped 1/15/23 2015)   ondansetron (ZOFRAN) injection 4 mg (4 mg Intravenous Given 1/15/23 1915)       Diagnostic Studies  Results Reviewed     Procedure Component Value Units Date/Time    FLU/RSV/COVID - if FLU/RSV clinically relevant [[id number]]  (Abnormal) Collected: 01/15/23 1838    Lab Status: Final result Specimen: Nares from Nose Updated: 01/15/23 1927     SARS-CoV-2 Negative     INFLUENZA A PCR Positive     INFLUENZA B PCR Negative     RSV PCR Negative    Narrative:      FOR PEDIATRIC PATIENTS - copy/paste COVID Guidelines URL to browser: [email or web address]    SARS-CoV-2 assay is a Nucleic Acid Amplification assay intended for the  qualitative detection of nucleic acid from SARS-CoV-2 in nasopharyngeal  swabs  Results are for the presumptive identification of SARS-CoV-2 RNA  Positive results are indicative of infection with SARS-CoV-2, the virus  causing COVID-19, but do not rule out bacterial infection or co-infection  with other viruses  Laboratories within the [place] and its  territories are required to report all positive results to the appropriate  public health authorities  Negative results do not preclude SARS-CoV-2  infection and should not be used as the sole basis for treatment or other  patient management decisions  Negative results must be combined with  clinical observations, patient history, and epidemiological information  This test has not been FDA cleared or approved  This test has been authorized by FDA under an Emergency Use Authorization  (EUA)   This test is only authorized for the duration of time the  declaration that circumstances exist justifying the authorization of the  emergency use of an in vitro diagnostic tests for detection of SARS-CoV-2  virus and/or diagnosis of COVID-19 infection under section 564(b)(1) of  the Act, 21 U  S C  215OJL-2(H)(4), unless the authorization is terminated  or revoked sooner  The test has been validated but independent review by FDA  and CLIA is pending  Test performed using [employer] Gene[employer]pert: This RT-PCR assay targets N2,  a region unique to SARS-CoV-2  A conserved region in the E-gene was chosen  for pan-Sarbecovirus detection which includes SARS-CoV-2  According to CMS-2020-01-R, this platform meets the definition of high-throughput technology  No orders to display              Procedures  Procedures         ED Course                               SBIRT 22yo+    Flowsheet Row Most Recent Value   SBIRT (23 yo +)    In order to provide better care to our patients, we are screening all of our patients for alcohol and drug use  Would it be okay to ask you these screening questions? Yes Filed at: 01/15/2023 1839   Initial Alcohol Screen: US AUDIT-C     1  How often do you have a drink containing alcohol? 0 Filed at: 01/15/2023 1839   2  How many drinks containing alcohol do you have on a typical day you are drinking? 0 Filed at: 01/15/2023 1839   3b  FEMALE Any Age, or MALE 65+: How often do you have 4 or more drinks on one occassion? 0 Filed at: 01/15/2023 1839   Audit-C Score 0 Filed at: 01/15/2023 1839   ZAINAB: How many times in the past year have you    Used an illegal drug or used a prescription medication for non-medical reasons?  Never Filed at: 01/15/2023 1839                    MDM    Disposition  Final diagnoses:   Influenza     Time reflects when diagnosis was documented in both MDM as applicable and the Disposition within this note     Time User Action Codes Description Comment    1/15/2023  7:36 PM Evangelist Rodriguez Add [J11 1] Influenza       ED Disposition     ED Disposition   Discharge    Condition   Stable Date/Time   Sun Naga 15, 2023  7:36 PM    Comment   Coni Coker discharge to home/self care  Follow-up Information    None         Discharge Medication List as of 1/15/2023  7:37 PM      START taking these medications    Details   ondansetron (Zofran ODT) 4 mg disintegrating tablet Take 1 tablet (4 mg total) by mouth every 6 (six) hours as needed for nausea or vomiting, Starting Sun 1/15/2023, Print         CONTINUE these medications which have NOT CHANGED    Details   ALPRAZolam (XANAX) 0 5 mg tablet Take 0 5 mg by mouth 2 (two) times a day as needed, Starting Tue 9/10/2019, Historical Med      Diclofenac Sodium (VOLTAREN) 1 % Apply 2 g topically 4 (four) times a day, Starting Wed 6/22/2022, Normal      flunisolide (NASALIDE) 25 MCG/ACT (0 025%) SOLN flunisolide 25 mcg (0 025 %) nasal spray, Historical Med      Multiple Vitamins-Minerals (MULTIVITAMIN ADULT PO) 1 DAILY, Historical Med             No discharge procedures on file      PDMP Review     None          ED Provider  Electronically Signed by           Anabell Rey MD  01/15/23 9420

## 2023-01-15 NOTE — Clinical Note
Shazia Ramirez was seen and treated in our emergency department on 1/15/2023  Diagnosis:     Wanda Epstein  may return to work on return date  She may return on this date: 01/22/2023         If you have any questions or concerns, please don't hesitate to call        Waylon Chaves MD    ______________________________           _______________          _______________  Hospital Representative                              Date                                Time

## 2023-07-20 ENCOUNTER — OFFICE VISIT (OUTPATIENT)
Dept: OBGYN CLINIC | Facility: CLINIC | Age: 53
End: 2023-07-20

## 2023-07-20 ENCOUNTER — APPOINTMENT (OUTPATIENT)
Dept: RADIOLOGY | Facility: CLINIC | Age: 53
End: 2023-07-20
Payer: COMMERCIAL

## 2023-07-20 VITALS
DIASTOLIC BLOOD PRESSURE: 79 MMHG | BODY MASS INDEX: 30.91 KG/M2 | HEART RATE: 70 BPM | HEIGHT: 62 IN | WEIGHT: 168 LBS | SYSTOLIC BLOOD PRESSURE: 124 MMHG

## 2023-07-20 DIAGNOSIS — M25.512 LEFT SHOULDER PAIN, UNSPECIFIED CHRONICITY: ICD-10-CM

## 2023-07-20 DIAGNOSIS — M75.82 TENDINITIS OF LEFT ROTATOR CUFF: ICD-10-CM

## 2023-07-20 DIAGNOSIS — M25.512 ACUTE PAIN OF LEFT SHOULDER: Primary | ICD-10-CM

## 2023-07-20 PROCEDURE — 73030 X-RAY EXAM OF SHOULDER: CPT

## 2023-07-20 RX ORDER — SEMAGLUTIDE 0.68 MG/ML
INJECTION, SOLUTION SUBCUTANEOUS
COMMUNITY
Start: 2023-06-26

## 2023-07-20 RX ORDER — LIDOCAINE HYDROCHLORIDE 10 MG/ML
2 INJECTION, SOLUTION INFILTRATION; PERINEURAL
Status: COMPLETED | OUTPATIENT
Start: 2023-07-20 | End: 2023-07-20

## 2023-07-20 RX ORDER — BUPIVACAINE HYDROCHLORIDE 2.5 MG/ML
2 INJECTION, SOLUTION INFILTRATION; PERINEURAL
Status: COMPLETED | OUTPATIENT
Start: 2023-07-20 | End: 2023-07-20

## 2023-07-20 RX ORDER — METHYLPREDNISOLONE ACETATE 40 MG/ML
2 INJECTION, SUSPENSION INTRA-ARTICULAR; INTRALESIONAL; INTRAMUSCULAR; SOFT TISSUE
Status: COMPLETED | OUTPATIENT
Start: 2023-07-20 | End: 2023-07-20

## 2023-07-20 RX ADMIN — BUPIVACAINE HYDROCHLORIDE 2 ML: 2.5 INJECTION, SOLUTION INFILTRATION; PERINEURAL at 14:15

## 2023-07-20 RX ADMIN — METHYLPREDNISOLONE ACETATE 2 ML: 40 INJECTION, SUSPENSION INTRA-ARTICULAR; INTRALESIONAL; INTRAMUSCULAR; SOFT TISSUE at 14:15

## 2023-07-20 RX ADMIN — LIDOCAINE HYDROCHLORIDE 2 ML: 10 INJECTION, SOLUTION INFILTRATION; PERINEURAL at 14:15

## 2023-07-20 NOTE — PROGRESS NOTES
Patient Name:  Megan Short  MRN:  [id number]    [id number] [facility]     1. Acute pain of left shoulder  -     XR shoulder 2+ vw left; Future; Expected date: 07/20/2023    2. Tendinitis of left rotator cuff  -     Large joint arthrocentesis: L subacromial bursa      Left shoulder rotator cuff tendonitis   · X-rays reviewed in office today with patient  · Discussed nonoperative treatments and patient wished to move forward with Left subacromial CSI. Tolerated procedure well. · Continue exercises as previously discussed with outpatient PT including postural strengthening and shoulder range of motion  · Continue OTC medications as needed  · Follow up in office as needed for reevaluation and consideration of additional imaging if symptoms persist or worsent    Chief Complaint     Left shoulder pain    History of the Present Illness     Megan Short is a RHD 46 y.o. female with Left shoulder pain ongoing for about 1 week without known injury. She admits she sleeps with her arm overhead and she may have been carrying something heavy. She admits to pain in the front and lateral aspect of the shoulder with overhead motions. She occasionally takes OTC medications for pain relief. Review of Systems     Review of Systems   Constitutional: Negative for chills and fever. HENT: Negative for congestion. Respiratory: Negative for cough, chest tightness and shortness of breath. Cardiovascular: Negative for chest pain and palpitations. Gastrointestinal: Negative for abdominal pain. Endocrine: Negative for cold intolerance and heat intolerance. Neurological: Negative for syncope. Psychiatric/Behavioral: Negative for confusion. Physical Exam     /79   Pulse 70   Ht 5' 2" (1.575 m)   Wt 76.2 kg (168 lb)   BMI 30.73 kg/m²     Left Shoulder:    Active range of motion   150 degrees forward flexion  130 degrees abduction  50 degrees external rotation   L4 internal rotation    Passive range of motion   170 degrees of forward flexion with mild discomfort    There is no tenderness present over the shoulder. Supraspinatus testing 5/5  Infraspinatus testing 5/5  Green test is positive  Ragley's test is negative    Speed's test is Negative  The patient is neurovascularly intact distally in the extremity. Eyes:  Anicteric sclerae. Neck:  Supple. Lungs:  Normal respiratory effort. Cardiovascular:  Capillary refill is less than 2 seconds. Skin:  Intact without erythema. Neurologic:  Sensation grossly intact to light touch. Psychiatric:  Mood and affect are appropriate. Data Review     I have personally reviewed pertinent films in PACS, and my interpretation follows:    X-rays taken 2023 of Left shoulder independently reviewed and demonstrate well maintained joint space, no acute fracture or dislocation noted.      Past Medical History:   Diagnosis Date   • Disease of thyroid gland        Past Surgical History:   Procedure Laterality Date   •  SECTION     • FOOT SURGERY     • HERNIA REPAIR     • LIPOSUCTION         Allergies   Allergen Reactions   • Diphenhydramine Tachycardia     Other reaction(s): Unknown  Other reaction(s): panic attack, swelling, hives     • Amoxicillin Hives and Rash       Current Outpatient Medications on File Prior to Visit   Medication Sig Dispense Refill   • ALPRAZolam (XANAX) 0.5 mg tablet Take 0.5 mg by mouth 2 (two) times a day as needed  0   • Diclofenac Sodium (VOLTAREN) 1 % Apply 2 g topically 4 (four) times a day 150 g 1   • flunisolide (NASALIDE) 25 MCG/ACT (0.025%) SOLN flunisolide 25 mcg (0.025 %) nasal spray     • Multiple Vitamins-Minerals (MULTIVITAMIN ADULT PO) 1 DAILY     • Ozempic, 0.25 or 0.5 MG/DOSE, 2 MG/3ML injection pen INJECT 0.25MG SUBCUTANEOUSLY ONCE A WEEK     • [DISCONTINUED] ondansetron (Zofran ODT) 4 mg disintegrating tablet Take 1 tablet (4 mg total) by mouth every 6 (six) hours as needed for nausea or vomiting 20 tablet 0 No current facility-administered medications on file prior to visit.        Social History     Tobacco Use   • Smoking status: Never   • Smokeless tobacco: Never   Vaping Use   • Vaping Use: Never used   Substance Use Topics   • Alcohol use: Yes     Comment: socially   • Drug use: Never       Family History   Problem Relation Age of Onset   • COPD Mother    • Diabetes Mother    • Thyroid disease Paternal Aunt    • Diabetes Maternal Grandmother              Procedures Performed     Large joint arthrocentesis: L subacromial bursa  Universal Protocol:  Risks and benefits: risks, benefits and alternatives were discussed  Consent given by: patient  Patient identity confirmed: verbally with patient    Supporting Documentation  Indications: pain   Procedure Details  Location: shoulder - L subacromial bursa  Needle size: 22 G  Approach: lateral  Medications administered: 2 mL bupivacaine 0.25 %; 2 mL lidocaine 1 %; 2 mL methylPREDNISolone acetate 40 mg/mL    Patient tolerance: patient tolerated the procedure well with no immediate complications  Dressing:  Sterile dressing applied              Radha Charlton DO

## 2023-11-13 ENCOUNTER — HOSPITAL ENCOUNTER (EMERGENCY)
Facility: HOSPITAL | Age: 53
Discharge: HOME | End: 2023-11-13
Attending: EMERGENCY MEDICINE
Payer: COMMERCIAL

## 2023-11-13 VITALS
DIASTOLIC BLOOD PRESSURE: 97 MMHG | RESPIRATION RATE: 20 BRPM | TEMPERATURE: 98.3 F | SYSTOLIC BLOOD PRESSURE: 185 MMHG | OXYGEN SATURATION: 100 % | HEART RATE: 86 BPM

## 2023-11-13 DIAGNOSIS — M75.01 ADHESIVE CAPSULITIS OF RIGHT SHOULDER: ICD-10-CM

## 2023-11-13 DIAGNOSIS — M75.31 CALCIFIC TENDONITIS OF RIGHT SHOULDER: ICD-10-CM

## 2023-11-13 DIAGNOSIS — R09.A2 FOREIGN BODY SENSATION IN THROAT: Primary | ICD-10-CM

## 2023-11-13 PROCEDURE — 99284 EMERGENCY DEPT VISIT MOD MDM: CPT | Performed by: PHYSICIAN ASSISTANT

## 2023-11-13 PROCEDURE — 99282 EMERGENCY DEPT VISIT SF MDM: CPT

## 2023-11-13 RX ORDER — PANTOPRAZOLE SODIUM 40 MG/1
40 TABLET, DELAYED RELEASE ORAL DAILY
Qty: 20 TABLET | Refills: 0 | Status: SHIPPED | OUTPATIENT
Start: 2023-11-13 | End: 2023-11-14 | Stop reason: SDUPTHER

## 2023-11-13 NOTE — ED PROVIDER NOTES
History  Chief Complaint   Patient presents with    Foreign Body in Throat     Pt reports that she was choking and someone preformed José Luisatilioyuki, she reports she did not cough the food back up but rather it went down. She still feels like something is stuck and reports a history of the same feelings in the past.        59-year-old female patient with possible esophageal foreign body. She was eating when she felt something get stuck about an hour or 2 ago. She felt like it could have been in her airway and had gone to 3 different people telling them that she was choking, vocalizing this, and found someone who had a with Heimlich. She did feel better afterwards. She was not coughing during this. She feels better now. She is tolerating p.o. feels like her throat is swollen and wanted to be evaluated. This is happened on 1-2 other occasions in the past few months. No history of similar prior to this. History provided by:  Patient   used: No    Foreign Body in Throat  Associated symptoms: no abdominal pain, no cough, no ear pain, no sore throat and no vomiting        Prior to Admission Medications   Prescriptions Last Dose Informant Patient Reported? Taking?    ALPRAZolam (XANAX) 0.5 mg tablet  Self Yes No   Sig: Take 0.5 mg by mouth 2 (two) times a day as needed   Diclofenac Sodium (VOLTAREN) 1 %  Self No No   Sig: Apply 2 g topically 4 (four) times a day   Multiple Vitamins-Minerals (MULTIVITAMIN ADULT PO)  Self Yes No   Si DAILY   Ozempic, 0.25 or 0.5 MG/DOSE, 2 MG/3ML injection pen  Self Yes No   Sig: INJECT 0.25MG SUBCUTANEOUSLY ONCE A WEEK   flunisolide (NASALIDE) 25 MCG/ACT (0.025%) SOLN  Self Yes No   Sig: flunisolide 25 mcg (0.025 %) nasal spray      Facility-Administered Medications: None       Past Medical History:   Diagnosis Date    Disease of thyroid gland        Past Surgical History:   Procedure Laterality Date     SECTION      FOOT SURGERY      HERNIA REPAIR LIPOSUCTION         Family History   Problem Relation Age of Onset    COPD Mother     Diabetes Mother     Thyroid disease Paternal Aunt     Diabetes Maternal Grandmother      I have reviewed and agree with the history as documented. E-Cigarette/Vaping    E-Cigarette Use Never User      E-Cigarette/Vaping Substances    Nicotine No     THC No     CBD No     Flavoring No     Other No     Unknown No      Social History     Tobacco Use    Smoking status: Never    Smokeless tobacco: Never   Vaping Use    Vaping Use: Never used   Substance Use Topics    Alcohol use: Yes     Comment: socially    Drug use: Never       Review of Systems   Constitutional:  Negative for chills and fever. HENT:  Negative for ear pain and sore throat. Eyes:  Negative for pain and visual disturbance. Respiratory:  Negative for cough and shortness of breath. Cardiovascular:  Negative for chest pain and palpitations. Gastrointestinal:  Negative for abdominal pain and vomiting. Genitourinary:  Negative for dysuria and hematuria. Musculoskeletal:  Negative for arthralgias and back pain. Skin:  Negative for color change and rash. Neurological:  Negative for seizures and syncope. All other systems reviewed and are negative. Physical Exam  Physical Exam  Vitals and nursing note reviewed. Constitutional:       General: She is not in acute distress. Appearance: She is well-developed. HENT:      Head: Normocephalic and atraumatic. Eyes:      Conjunctiva/sclera: Conjunctivae normal.   Cardiovascular:      Rate and Rhythm: Normal rate and regular rhythm. Heart sounds: No murmur heard. Pulmonary:      Effort: Pulmonary effort is normal. No respiratory distress. Breath sounds: Normal breath sounds. Abdominal:      Palpations: Abdomen is soft. Tenderness: There is no abdominal tenderness. Musculoskeletal:         General: No swelling. Cervical back: Neck supple.    Skin:     General: Skin is warm and dry. Capillary Refill: Capillary refill takes less than 2 seconds. Neurological:      Mental Status: She is alert. Psychiatric:         Mood and Affect: Mood normal.         Vital Signs  ED Triage Vitals [11/13/23 1408]   Temperature Pulse Respirations Blood Pressure SpO2   98.3 °F (36.8 °C) 86 20 (!) 185/97 100 %      Temp Source Heart Rate Source Patient Position - Orthostatic VS BP Location FiO2 (%)   Temporal Monitor Sitting Left arm --      Pain Score       --           Vitals:    11/13/23 1408   BP: (!) 185/97   Pulse: 86   Patient Position - Orthostatic VS: Sitting         Visual Acuity      ED Medications  Medications - No data to display    Diagnostic Studies  Results Reviewed       None                   No orders to display              Procedures  Procedures         ED Course                               SBIRT 20yo+      Flowsheet Row Most Recent Value   Initial Alcohol Screen: US AUDIT-C     1. How often do you have a drink containing alcohol? 3 Filed at: 11/13/2023 1512   2. How many drinks containing alcohol do you have on a typical day you are drinking? 2 Filed at: 11/13/2023 1512   3b. FEMALE Any Age, or MALE 65+: How often do you have 4 or more drinks on one occassion? 0 Filed at: 11/13/2023 1512   Audit-C Score 5 Filed at: 11/13/2023 1512   ZAINAB: How many times in the past year have you. .. Used an illegal drug or used a prescription medication for non-medical reasons? Never Filed at: 11/13/2023 1512                      Medical Decision Making  Differential diagnosis includes but is not limited to aspiration, esophageal impaction, foreign body sensation, esophagitis, GERD. Plan/MDM: 49 yo with possible food impaction. She tolerated PO during my exam. Able to drink water without vomiting. She feels better. Discussed GI f/u. Start protonix. PCP f/u her elevated BP. Risk  Prescription drug management.              Disposition  Final diagnoses:   Foreign body sensation in throat   Calcific tendonitis of right shoulder   Adhesive capsulitis of right shoulder     Time reflects when diagnosis was documented in both MDM as applicable and the Disposition within this note       Time User Action Codes Description Comment    11/13/2023  3:09 PM Vanessa Garcia Add [R09. A2] Foreign body sensation in throat     11/13/2023  3:17 PM Vanessa Garcia Add [M75.31] Calcific tendonitis of right shoulder     11/13/2023  3:17 PM Vanessa Garcia Add [M75.01] Adhesive capsulitis of right shoulder           ED Disposition       ED Disposition   Discharge    Condition   Stable    Date/Time   Mon Nov 13, 2023 12 [address] discharge to home/self care.                    Follow-up Information       Follow up With Specialties Details Why Contact Info Additional [address] [facility] Call today  [address] [facility]  [phone] [facility], [address], [place], [address] [place] [address] [phone]  [phone] [phone]  [address] [place] [address]            Patient's Medications   Discharge Prescriptions    PANTOPRAZOLE (PROTONIX) 40 MG TABLET    Take 1 tablet (40 mg total) by mouth daily       Start Date: 11/13/2023End Date: --       Order Dose: 40 mg       Quantity: 20 tablet    Refills: 0           PDMP Review       None            ED Provider  Electronically Signed by             Vanessa Garcia PA-C  11/13/23 1857

## 2023-11-13 NOTE — DISCHARGE INSTRUCTIONS
Return sooner to the Emergency Department if increased shortness of breath, fever, pain, vomiting, bleeding, weakness, dizziness.

## 2023-11-14 ENCOUNTER — OFFICE VISIT (OUTPATIENT)
Dept: GASTROENTEROLOGY | Facility: CLINIC | Age: 53
End: 2023-11-14
Payer: COMMERCIAL

## 2023-11-14 VITALS
WEIGHT: 153 LBS | HEART RATE: 85 BPM | SYSTOLIC BLOOD PRESSURE: 124 MMHG | HEIGHT: 62 IN | DIASTOLIC BLOOD PRESSURE: 82 MMHG | BODY MASS INDEX: 28.16 KG/M2
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
## 2023-11-14 DIAGNOSIS — R09.A2 FOREIGN BODY SENSATION IN THROAT: Primary | ICD-10-CM

## 2023-11-14 DIAGNOSIS — T18.128A FOOD IMPACTION OF ESOPHAGUS, INITIAL ENCOUNTER: ICD-10-CM

## 2023-11-14 PROCEDURE — 99203 OFFICE O/P NEW LOW 30 MIN: CPT | Performed by: PHYSICIAN ASSISTANT

## 2023-11-14 RX ORDER — PANTOPRAZOLE SODIUM 40 MG/1
40 TABLET, DELAYED RELEASE ORAL DAILY
Qty: 60 TABLET | Refills: 0 | Status: SHIPPED | OUTPATIENT
Start: 2023-11-14

## 2023-11-14 NOTE — PROGRESS NOTES
[facility] - Outpatient Consultation  Wilfrido Coleman 48 y.o. female MRN: [id number]  Encounter: [id number]          ASSESSMENT AND PLAN:      1. Foreign body sensation in throat  2. Food impaction of esophagus, initial encounter  - She reports at least several months of intermittent belching, regurgitation, and a globus sensation at times leading up to a food impaction yesterday that did pass after some time without intervention  - Agree with PPI course for now  - Plan for EGD to assess for underlying cause of food impaction; discussed differential including esophagitis v stricture v EoE  - Anti-reflux diet for now  - Follow up pending EGD findings    ______________________________________________________________________    HPI: Luci Quigley is a 49 yo F with PMH of labile thyroid function, presenting due to an episode of a food impaction yesterday preceded by several months of a globus sensation intermittently and regurgitation symptoms. She has been on Ozempic since June for weight loss and feels like since this time, has had mild intermittent symptoms such as belching or feeling like food is sticking in her chest. Then yesterday she was eating tuna on a cracker and it became lodged upon swallowing where she couldn't swallow water without regurgitating. Someone performed the heimlich and the food passed. She went to the ER for evaluation and was prescribed protonix as well as referred to GI. She doesn't believe she had any symptoms like this prior to the [address] [place] [address]. She had an EGD about 10 years ago and believes it was normal. She had a colonoscopy at 48 and reports this was a normal exam.      REVIEW OF SYSTEMS:    CONSTITUTIONAL: Denies any fever, chills, rigors, and weight loss. HEENT: No earache or tinnitus. Denies hearing loss or visual disturbances. CARDIOVASCULAR: No chest pain or palpitations.    RESPIRATORY: Denies any cough, hemoptysis, shortness of breath or dyspnea on exertion. GASTROINTESTINAL: As noted in the History of Present Illness. GENITOURINARY: No problems with urination. Denies any hematuria or dysuria. NEUROLOGIC: No dizziness or vertigo, denies headaches. MUSCULOSKELETAL: Denies any muscle or joint pain. SKIN: Denies skin rashes or itching. ENDOCRINE: Denies excessive thirst. Denies intolerance to heat or cold. PSYCHOSOCIAL: Denies depression or anxiety. Denies any recent memory loss. Historical Information   Past Medical History:   Diagnosis Date    Disease of thyroid gland      Past Surgical History:   Procedure Laterality Date     SECTION      FOOT SURGERY      HERNIA REPAIR      LIPOSUCTION       Social History   Social History     Substance and Sexual Activity   Alcohol Use Yes    Comment: socially     Social History     Substance and Sexual Activity   Drug Use Never     Social History     Tobacco Use   Smoking Status Never   Smokeless Tobacco Never     Family History   Problem Relation Age of Onset    COPD Mother     Diabetes Mother     Thyroid disease Paternal Aunt     Diabetes Maternal Grandmother        Meds/Allergies       Current Outpatient Medications:     ALPRAZolam (XANAX) 0.5 mg tablet    Diclofenac Sodium (VOLTAREN) 1 %    flunisolide (NASALIDE) 25 MCG/ACT (0.025%) SOLN    Multiple Vitamins-Minerals (MULTIVITAMIN ADULT PO)    Ozempic, 0.25 or 0.5 MG/DOSE, 2 MG/3ML injection pen    pantoprazole (PROTONIX) 40 mg tablet    Allergies   Allergen Reactions    Diphenhydramine Tachycardia     Other reaction(s): Unknown  Other reaction(s): panic attack, swelling, hives      Amoxicillin Hives and Rash           Objective     Blood pressure 124/82, pulse 85, height 5' 2" (1.575 m), weight 69.4 kg (153 lb). Body mass index is 27.98 kg/m². PHYSICAL EXAM:      General Appearance:   Alert, cooperative, no distress   HEENT:   Normocephalic, atraumatic, anicteric.      Neck:  Supple, symmetrical, trachea midline   Lungs:   Clear to auscultation bilaterally; no rales, rhonchi or wheezing; respirations unlabored    Heart[de-identified]   Regular rate and rhythm; no murmur, rub, or gallop. Abdomen:   Soft, non-tender, non-distended; normal bowel sounds; no masses, no organomegaly    Genitalia:   Deferred    Rectal:   Deferred    Extremities:  No cyanosis, clubbing or edema    Pulses:  2+ and symmetric    Skin:  No jaundice, rashes, or lesions    Lymph nodes:  No palpable cervical lymphadenopathy        Lab Results:   No visits with results within 1 Day(s) from this visit. Latest known visit with results is:   Admission on 01/15/2023, Discharged on 01/15/2023   Component Date Value    SARS-CoV-2 01/15/2023 Negative     INFLUENZA A PCR 01/15/2023 Positive (A)     INFLUENZA B PCR 01/15/2023 Negative     RSV PCR 01/15/2023 Negative          Radiology Results:   No results found.

## 2023-11-14 NOTE — H&P (VIEW-ONLY)
[facility] - Outpatient Consultation  Jessica Kelley 53 y.o. female MRN: [id number]  Encounter: [id number]          ASSESSMENT AND PLAN:      1. Foreign body sensation in throat  2. Food impaction of esophagus, initial encounter  - She reports at least several months of intermittent belching, regurgitation, and a globus sensation at times leading up to a food impaction yesterday that did pass after some time without intervention  - Agree with PPI course for now  - Plan for EGD to assess for underlying cause of food impaction; discussed differential including esophagitis v stricture v EoE  - Anti-reflux diet for now  - Follow up pending EGD findings    ______________________________________________________________________    HPI: Jessica is a 52 yo F with PMH of labile thyroid function, presenting due to an episode of a food impaction yesterday preceded by several months of a globus sensation intermittently and regurgitation symptoms. She has been on Ozempic since June for weight loss and feels like since this time, has had mild intermittent symptoms such as belching or feeling like food is sticking in her chest. Then yesterday she was eating tuna on a cracker and it became lodged upon swallowing where she couldn't swallow water without regurgitating. Someone performed the heimlich and the food passed. She went to the ER for evaluation and was prescribed protonix as well as referred to GI. She doesn't believe she had any symptoms like this prior to the Ozempic. She had an EGD about 10 years ago and believes it was normal. She had a colonoscopy at 50 and reports this was a normal exam.      REVIEW OF SYSTEMS:    CONSTITUTIONAL: Denies any fever, chills, rigors, and weight loss.  HEENT: No earache or tinnitus. Denies hearing loss or visual disturbances.  CARDIOVASCULAR: No chest pain or palpitations.   RESPIRATORY: Denies any cough, hemoptysis, shortness of breath or dyspnea on  "exertion.  GASTROINTESTINAL: As noted in the History of Present Illness.   GENITOURINARY: No problems with urination. Denies any hematuria or dysuria.  NEUROLOGIC: No dizziness or vertigo, denies headaches.   MUSCULOSKELETAL: Denies any muscle or joint pain.   SKIN: Denies skin rashes or itching.   ENDOCRINE: Denies excessive thirst. Denies intolerance to heat or cold.  PSYCHOSOCIAL: Denies depression or anxiety. Denies any recent memory loss.       Historical Information   Past Medical History:   Diagnosis Date    Disease of thyroid gland      Past Surgical History:   Procedure Laterality Date     SECTION      FOOT SURGERY      HERNIA REPAIR      LIPOSUCTION       Social History   Social History     Substance and Sexual Activity   Alcohol Use Yes    Comment: socially     Social History     Substance and Sexual Activity   Drug Use Never     Social History     Tobacco Use   Smoking Status Never   Smokeless Tobacco Never     Family History   Problem Relation Age of Onset    COPD Mother     Diabetes Mother     Thyroid disease Paternal Aunt     Diabetes Maternal Grandmother        Meds/Allergies       Current Outpatient Medications:     ALPRAZolam (XANAX) 0.5 mg tablet    Diclofenac Sodium (VOLTAREN) 1 %    flunisolide (NASALIDE) 25 MCG/ACT (0.025%) SOLN    Multiple Vitamins-Minerals (MULTIVITAMIN ADULT PO)    Ozempic, 0.25 or 0.5 MG/DOSE, 2 MG/3ML injection pen    pantoprazole (PROTONIX) 40 mg tablet    Allergies   Allergen Reactions    Diphenhydramine Tachycardia     Other reaction(s): Unknown  Other reaction(s): panic attack, swelling, hives      Amoxicillin Hives and Rash           Objective     Blood pressure 124/82, pulse 85, height 5' 2\" (1.575 m), weight 69.4 kg (153 lb). Body mass index is 27.98 kg/m².        PHYSICAL EXAM:      General Appearance:   Alert, cooperative, no distress   HEENT:   Normocephalic, atraumatic, anicteric.     Neck:  Supple, symmetrical, trachea midline   Lungs:   Clear to " auscultation bilaterally; no rales, rhonchi or wheezing; respirations unlabored    Heart::   Regular rate and rhythm; no murmur, rub, or gallop.   Abdomen:   Soft, non-tender, non-distended; normal bowel sounds; no masses, no organomegaly    Genitalia:   Deferred    Rectal:   Deferred    Extremities:  No cyanosis, clubbing or edema    Pulses:  2+ and symmetric    Skin:  No jaundice, rashes, or lesions    Lymph nodes:  No palpable cervical lymphadenopathy        Lab Results:   No visits with results within 1 Day(s) from this visit.   Latest known visit with results is:   Admission on 01/15/2023, Discharged on 01/15/2023   Component Date Value    SARS-CoV-2 01/15/2023 Negative     INFLUENZA A PCR 01/15/2023 Positive (A)     INFLUENZA B PCR 01/15/2023 Negative     RSV PCR 01/15/2023 Negative          Radiology Results:   No results found.

## 2023-11-14 NOTE — LETTER
November 16, 2023     [facility], [address] [address] [address] [address]    Patient: Lucas Wolff   YOB: 1970   Date of Visit: 11/14/2023       Dear Dr. Gaby Santoyo:    Thank you for referring Sacha Hidalgo to me for evaluation. Below are my notes for this consultation. If you have questions, please do not hesitate to call me. I look forward to following your patient along with you. Sincerely,        Bertrand Mujica PA-C        CC: No Recipients    Marjan Dobbs  11/14/2023  1:45 PM  Signed  [facility] - Outpatient Consultation  Lucas Wolff 48 y.o. female MRN: [id number]  Encounter: [id number]          ASSESSMENT AND PLAN:      1. Foreign body sensation in throat  2. Food impaction of esophagus, initial encounter  - She reports at least several months of intermittent belching, regurgitation, and a globus sensation at times leading up to a food impaction yesterday that did pass after some time without intervention  - Agree with PPI course for now  - Plan for EGD to assess for underlying cause of food impaction; discussed differential including esophagitis v stricture v EoE  - Anti-reflux diet for now  - Follow up pending EGD findings    ______________________________________________________________________    HPI: Gabriella Anaya is a 49 yo F with PMH of labile thyroid function, presenting due to an episode of a food impaction yesterday preceded by several months of a globus sensation intermittently and regurgitation symptoms. She has been on Ozempic since June for weight loss and feels like since this time, has had mild intermittent symptoms such as belching or feeling like food is sticking in her chest. Then yesterday she was eating tuna on a cracker and it became lodged upon swallowing where she couldn't swallow water without regurgitating. Someone performed the heimlich and the food passed.  She went to the ER for evaluation and was prescribed protonix as well as referred to GI. She doesn't believe she had any symptoms like this prior to the [facility]. She had an EGD about 10 years ago and believes it was normal. She had a colonoscopy at 48 and reports this was a normal exam.      REVIEW OF SYSTEMS:    CONSTITUTIONAL: Denies any fever, chills, rigors, and weight loss. HEENT: No earache or tinnitus. Denies hearing loss or visual disturbances. CARDIOVASCULAR: No chest pain or palpitations. RESPIRATORY: Denies any cough, hemoptysis, shortness of breath or dyspnea on exertion. GASTROINTESTINAL: As noted in the History of Present Illness. GENITOURINARY: No problems with urination. Denies any hematuria or dysuria. NEUROLOGIC: No dizziness or vertigo, denies headaches. MUSCULOSKELETAL: Denies any muscle or joint pain. SKIN: Denies skin rashes or itching. ENDOCRINE: Denies excessive thirst. Denies intolerance to heat or cold. PSYCHOSOCIAL: Denies depression or anxiety. Denies any recent memory loss.        Historical Information  Past Medical History:   Diagnosis Date   • Disease of thyroid gland      Past Surgical History:   Procedure Laterality Date   •  SECTION     • FOOT SURGERY     • HERNIA REPAIR     • LIPOSUCTION       Social History  Social History     Substance and Sexual Activity   Alcohol Use Yes    Comment: socially     Social History     Substance and Sexual Activity   Drug Use Never     Social History     Tobacco Use   Smoking Status Never   Smokeless Tobacco Never     Family History   Problem Relation Age of Onset   • COPD Mother    • Diabetes Mother    • Thyroid disease Paternal Aunt    • Diabetes Maternal Grandmother        Meds/Allergies      Current Outpatient Medications:   •  ALPRAZolam (XANAX) 0.5 mg tablet  •  Diclofenac Sodium (VOLTAREN) 1 %  •  flunisolide (NASALIDE) 25 MCG/ACT (0.025%) SOLN  •  Multiple Vitamins-Minerals (MULTIVITAMIN ADULT PO)  •  Ozempic, 0.25 or 0.5 MG/DOSE, 2 MG/3ML injection pen  •  pantoprazole (PROTONIX) 40 mg tablet    Allergies   Allergen Reactions   • Diphenhydramine Tachycardia     Other reaction(s): Unknown  Other reaction(s): panic attack, swelling, hives     • Amoxicillin Hives and Rash           Objective    Blood pressure 124/82, pulse 85, height 5' 2" (1.575 m), weight 69.4 kg (153 lb). Body mass index is 27.98 kg/m². PHYSICAL EXAM:      General Appearance:   Alert, cooperative, no distress   HEENT:   Normocephalic, atraumatic, anicteric. Neck:  Supple, symmetrical, trachea midline   Lungs:   Clear to auscultation bilaterally; no rales, rhonchi or wheezing; respirations unlabored    Heart[de-identified]   Regular rate and rhythm; no murmur, rub, or gallop. Abdomen:   Soft, non-tender, non-distended; normal bowel sounds; no masses, no organomegaly    Genitalia:   Deferred    Rectal:   Deferred    Extremities:  No cyanosis, clubbing or edema    Pulses:  2+ and symmetric    Skin:  No jaundice, rashes, or lesions    Lymph nodes:  No palpable cervical lymphadenopathy        Lab Results:   No visits with results within 1 Day(s) from this visit. Latest known visit with results is:   Admission on 01/15/2023, Discharged on 01/15/2023   Component Date Value   • SARS-CoV-2 01/15/2023 Negative    • INFLUENZA A PCR 01/15/2023 Positive (A)    • INFLUENZA B PCR 01/15/2023 Negative    • RSV PCR 01/15/2023 Negative          Radiology Results:   No results found.

## 2023-11-14 NOTE — PATIENT INSTRUCTIONS
Scheduled date of EGD(as of today):12/13/23  Physician performing EGD:Delgado  Location of EGD:[facility]  Instructions reviewed with patient by:Sasha VIVEROS  Clearances:  none

## 2023-12-04 ENCOUNTER — OFFICE VISIT (OUTPATIENT)
Age: 53
End: 2023-12-04
Payer: COMMERCIAL

## 2023-12-04 VITALS
OXYGEN SATURATION: 99 % | BODY MASS INDEX: 28.9 KG/M2 | WEIGHT: 158 LBS | TEMPERATURE: 97.4 F | RESPIRATION RATE: 16 BRPM | HEART RATE: 70 BPM

## 2023-12-04 DIAGNOSIS — T16.2XXA EAR FOREIGN BODY, LEFT, INITIAL ENCOUNTER: Primary | ICD-10-CM

## 2023-12-04 PROCEDURE — 99213 OFFICE O/P EST LOW 20 MIN: CPT | Performed by: PHYSICIAN ASSISTANT

## 2023-12-04 NOTE — PROGRESS NOTES
[facility]        NAME: Guicho Palmer is a 48 y.o. female  : 1970    MRN: [id number]  DATE: 2023  TIME: 4:54 PM    Assessment and Plan   Ear foreign body, left, initial encounter [T16. 2XXA]  1. Ear foreign body, left, initial encounter          There is no foreign body in your left ear or right ear. Patient Instructions     The cotton Q-tip must of came out of your left ear. There is no foreign body in your left ear. Left ear TM is gray, pearly and shiny as it should be. Canal is clear of any foreign body. In the future do not place anything larger than your elbow and your ear. Reassurance    Follow up with PCP in 3-5 days. Proceed to  ER if symptoms worsen. Chief Complaint     Chief Complaint   Patient presents with    Foreign Body in Ear     Pt states she got cotton from a qtip stuck inside her left ear yesterday         History of Present Illness       Patient reports she was cleaning the left ear with a Q-tips and believes the head of the Q-tip is in her ear canal.  Denies tinnitus vertigo, dizziness lightheadedness, or ear pain. Review of Systems   Review of Systems   Constitutional:  Negative for fever. HENT:  Negative for ear discharge, ear pain and tinnitus. Respiratory:  Negative for cough. Neurological:  Negative for dizziness and light-headedness.          Current Medications       Current Outpatient Medications:     ALPRAZolam (XANAX) 0.5 mg tablet, Take 0.5 mg by mouth 2 (two) times a day as needed, Disp: , Rfl: 0    Diclofenac Sodium (VOLTAREN) 1 %, Apply 2 g topically 4 (four) times a day, Disp: 150 g, Rfl: 1    flunisolide (NASALIDE) 25 MCG/ACT (0.025%) SOLN, flunisolide 25 mcg (0.025 %) nasal spray, Disp: , Rfl:     Multiple Vitamins-Minerals (MULTIVITAMIN ADULT PO), 1 DAILY, Disp: , Rfl:     Ozempic, 0.25 or 0.5 MG/DOSE, 2 MG/3ML injection pen, INJECT 0.25MG SUBCUTANEOUSLY ONCE A WEEK, Disp: , Rfl:     pantoprazole (PROTONIX) 40 mg tablet, Take 1 tablet (40 mg total) by mouth daily, Disp: 60 tablet, Rfl: 0    Current Allergies     Allergies as of 2023 - Reviewed 2023   Allergen Reaction Noted    Diphenhydramine Tachycardia 2009    Amoxicillin Hives and Rash 2017            The following portions of the patient's history were reviewed and updated as appropriate: allergies, current medications, past family history, past medical history, past social history, past surgical history and problem list.     Past Medical History:   Diagnosis Date    Disease of thyroid gland        Past Surgical History:   Procedure Laterality Date     SECTION      FOOT SURGERY      HERNIA REPAIR      LIPOSUCTION         Family History   Problem Relation Age of Onset    COPD Mother     Diabetes Mother     Thyroid disease Paternal Aunt     Diabetes Maternal Grandmother          Medications have been verified. Objective   Pulse 70   Temp (!) 97.4 °F (36.3 °C)   Resp 16   Wt 71.7 kg (158 lb)   SpO2 99%   BMI 28.90 kg/m²        Physical Exam     Physical Exam  Vitals and nursing note reviewed. Constitutional:       General: She is not in acute distress. Appearance: Normal appearance. She is not ill-appearing. HENT:      Right Ear: Tympanic membrane, ear canal and external ear normal.      Left Ear: Tympanic membrane, ear canal and external ear normal.      Nose: Nose normal.      Mouth/Throat:      Mouth: Mucous membranes are moist.      Pharynx: Oropharynx is clear. Eyes:      General: No scleral icterus. Extraocular Movements: Extraocular movements intact. Conjunctiva/sclera: Conjunctivae normal.      Pupils: Pupils are equal, round, and reactive to light. Cardiovascular:      Rate and Rhythm: Normal rate and regular rhythm. Pulses: Normal pulses. Pulmonary:      Effort: Pulmonary effort is normal. No respiratory distress. Musculoskeletal:         General: Normal range of motion.       Cervical back: Normal range of motion and neck supple. Skin:     General: Skin is warm and dry. Neurological:      General: No focal deficit present. Mental Status: She is alert and oriented to person, place, and time.       Coordination: Coordination normal.      Gait: Gait normal.   Psychiatric:         Mood and Affect: Mood normal.         Behavior: Behavior normal.

## 2023-12-10 DIAGNOSIS — R09.A2 FOREIGN BODY SENSATION IN THROAT: ICD-10-CM

## 2023-12-11 RX ORDER — PANTOPRAZOLE SODIUM 40 MG/1
40 TABLET, DELAYED RELEASE ORAL DAILY
Qty: 90 TABLET | Refills: 1 | Status: SHIPPED | OUTPATIENT
Start: 2023-12-11 | End: 2023-12-13 | Stop reason: HOSPADM

## 2023-12-13 ENCOUNTER — HOSPITAL ENCOUNTER (OUTPATIENT)
Dept: GASTROENTEROLOGY | Facility: HOSPITAL | Age: 53
Setting detail: OUTPATIENT SURGERY
Discharge: HOME | End: 2023-12-13
Admitting: INTERNAL MEDICINE
Payer: COMMERCIAL

## 2023-12-13 ENCOUNTER — ANESTHESIA (OUTPATIENT)
Dept: GASTROENTEROLOGY | Facility: HOSPITAL | Age: 53
End: 2023-12-13

## 2023-12-13 ENCOUNTER — ANESTHESIA EVENT (OUTPATIENT)
Dept: GASTROENTEROLOGY | Facility: HOSPITAL | Age: 53
End: 2023-12-13

## 2023-12-13 VITALS
OXYGEN SATURATION: 99 % | DIASTOLIC BLOOD PRESSURE: 70 MMHG | HEART RATE: 70 BPM | WEIGHT: 154.76 LBS | SYSTOLIC BLOOD PRESSURE: 121 MMHG | RESPIRATION RATE: 20 BRPM | TEMPERATURE: 97.1 F | HEIGHT: 62 IN | BODY MASS INDEX: 28.48 KG/M2

## 2023-12-13 DIAGNOSIS — R09.A2 FOREIGN BODY SENSATION IN THROAT: ICD-10-CM
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
## 2023-12-13 DIAGNOSIS — T18.128A FOOD IMPACTION OF ESOPHAGUS, INITIAL ENCOUNTER: ICD-10-CM

## 2023-12-13 PROCEDURE — 88305 TISSUE EXAM BY PATHOLOGIST: CPT | Performed by: PATHOLOGY

## 2023-12-13 PROCEDURE — 43239 EGD BIOPSY SINGLE/MULTIPLE: CPT | Performed by: INTERNAL MEDICINE

## 2023-12-13 RX ORDER — LIDOCAINE HYDROCHLORIDE 20 MG/ML
INJECTION, SOLUTION EPIDURAL; INFILTRATION; INTRACAUDAL; PERINEURAL AS NEEDED
Status: DISCONTINUED | OUTPATIENT
Start: 2023-12-13 | End: 2023-12-13

## 2023-12-13 RX ORDER — SODIUM CHLORIDE, SODIUM LACTATE, POTASSIUM CHLORIDE, CALCIUM CHLORIDE 600; 310; 30; 20 MG/100ML; MG/100ML; MG/100ML; MG/100ML
INJECTION, SOLUTION INTRAVENOUS CONTINUOUS PRN
Status: DISCONTINUED | OUTPATIENT
Start: 2023-12-13 | End: 2023-12-13

## 2023-12-13 RX ORDER — PROPOFOL 10 MG/ML
INJECTION, EMULSION INTRAVENOUS AS NEEDED
Status: DISCONTINUED | OUTPATIENT
Start: 2023-12-13 | End: 2023-12-13

## 2023-12-13 RX ORDER — PANTOPRAZOLE SODIUM 40 MG/1
40 TABLET, DELAYED RELEASE ORAL DAILY
Qty: 90 TABLET | Refills: 1 | Status: SHIPPED | OUTPATIENT
Start: 2023-12-13

## 2023-12-13 RX ADMIN — PROPOFOL 50 MG: 10 INJECTION, EMULSION INTRAVENOUS at 07:38

## 2023-12-13 RX ADMIN — PROPOFOL 100 MG: 10 INJECTION, EMULSION INTRAVENOUS at 07:34

## 2023-12-13 RX ADMIN — PROPOFOL 100 MG: 10 INJECTION, EMULSION INTRAVENOUS at 07:35

## 2023-12-13 RX ADMIN — LIDOCAINE HYDROCHLORIDE 100 MG: 20 INJECTION, SOLUTION EPIDURAL; INFILTRATION; INTRACAUDAL at 07:33

## 2023-12-13 RX ADMIN — SODIUM CHLORIDE, SODIUM LACTATE, POTASSIUM CHLORIDE, AND CALCIUM CHLORIDE: .6; .31; .03; .02 INJECTION, SOLUTION INTRAVENOUS at 07:28

## 2023-12-13 NOTE — INTERVAL H&P NOTE
H&P reviewed. After examining the patient I find no changes in the patients condition since the H&P had been written.    Vitals:    12/13/23 0639   BP: 148/84   Pulse: 89   Resp: 20   Temp: 98 °F (36.7 °C)   SpO2: 100%

## 2023-12-13 NOTE — ANESTHESIA PREPROCEDURE EVALUATION
Procedure:  EGD    Relevant Problems   MUSCULOSKELETAL   (+) Adhesive capsulitis of right shoulder      Disease of thyroid gland       Physical Exam    Airway    Mallampati score: I  TM Distance: >3 FB  Neck ROM: full     Dental       Cardiovascular  Cardiovascular exam normal    Pulmonary  Pulmonary exam normal     Other Findings  post-pubertal.      Anesthesia Plan  ASA Score- 2     Anesthesia Type- IV sedation with anesthesia with ASA Monitors. Additional Monitors:     Airway Plan:            Plan Factors-Exercise tolerance (METS): >4 METS. Chart reviewed. EKG reviewed. Imaging results reviewed. Existing labs reviewed. Patient summary reviewed. Induction- intravenous. Postoperative Plan-     Informed Consent- Anesthetic plan and risks discussed with patient. I personally reviewed this patient with the CRNA. Discussed and agreed on the Anesthesia Plan with the CRNA. Miki Ramon

## 2023-12-13 NOTE — ANESTHESIA POSTPROCEDURE EVALUATION
Post-Op Assessment Note    CV Status:  Stable  Pain Score: 0    Pain management: adequate       Mental Status:  Alert and awake   Hydration Status:  Euvolemic   PONV Controlled:  Controlled   Airway Patency:  Patent     Post Op Vitals Reviewed: Yes      Staff: CRNA               /61 (12/13/23 0743)    Temp (Abnormal) 97.1 °F (36.2 °C) (12/13/23 0743)    Pulse 78 (12/13/23 0743)   Resp 18 (12/13/23 0743)    SpO2 95 % (12/13/23 0743)

## 2023-12-13 NOTE — DISCHARGE INSTRUCTIONS
Upper Endoscopy   WHAT YOU NEED TO KNOW:   An upper endoscopy is also called an upper gastrointestinal (GI) endoscopy, or an esophagogastroduodenoscopy (EGD). It is a procedure to examine the inside of your esophagus, stomach, and duodenum (first part of the small intestine) with a scope. You may feel bloated, gassy, or have some abdominal discomfort after your procedure. Your throat may be sore for 24 to 36 hours. You may burp or pass gas from air that is still inside your body.         DISCHARGE INSTRUCTIONS:   Seek care immediately if:   You have sudden, severe abdominal pain.     You have problems swallowing.     You have a large amount of black, sticky bowel movements or blood in your bowel movements.     You have sudden trouble breathing.     You feel weak, lightheaded, or faint or your heart beats faster than normal for you.     Contact your healthcare provider if:   You have a fever and chills.      You have nausea or are vomiting.      Your abdomen is bloated or feels full and hard.     You have abdominal pain.   You have black, sticky bowel movements or blood in your bowel movements.  You have not had a bowel movement for 3 days after your procedure.  You have rash or hives.  You have questions or concerns about your procedure.    Activity:   Do not lift, strain, or run for 24 hours after your procedure.     Rest after your procedure. You have been given medicine to relax you. Do not drive or make important decisions until the day after your procedure. Return to your normal activity as directed.     Relieve gas and discomfort from bloating by lying on your right side with a heating pad on your abdomen. You may need to take short walks to help the gas move out. Eat small meals until bloating is relieved.  Follow up with your healthcare provider as directed: Write down your questions so you remember to ask them during your visits.     If you take a “blood thinner”, please review the specific instructions  from your endoscopist about when you should resume it. These can be found in the “Recommendation” and “Your Medication list” sections of this After Visit Summary.

## 2023-12-18 PROCEDURE — 88305 TISSUE EXAM BY PATHOLOGIST: CPT | Performed by: PATHOLOGY

## 2023-12-19 DIAGNOSIS — K20.0 EOSINOPHILIC ESOPHAGITIS: Primary | ICD-10-CM

## 2023-12-19 RX ORDER — FLUTICASONE PROPIONATE 250 UG/1
1 POWDER, METERED RESPIRATORY (INHALATION) 2 TIMES DAILY
Qty: 180 BLISTER | Refills: 0 | Status: SHIPPED | OUTPATIENT
Start: 2023-12-19 | End: 2024-03-18

## 2023-12-20 ENCOUNTER — TELEPHONE (OUTPATIENT)
Age: 53
End: 2023-12-20

## 2023-12-20 NOTE — TELEPHONE ENCOUNTER
PT calling to ask for recommendations on allergist. PT also confirming prescription for flovent was sent to pharmacy.

## 2023-12-20 NOTE — TELEPHONE ENCOUNTER
Completed PA form for fluticasone and faxed to [facility] with clinicals to   Awaiting determination

## 2023-12-21 ENCOUNTER — OFFICE VISIT (OUTPATIENT)
Dept: FAMILY MEDICINE CLINIC | Facility: CLINIC | Age: 53
End: 2023-12-21
Payer: COMMERCIAL

## 2023-12-21 VITALS
DIASTOLIC BLOOD PRESSURE: 70 MMHG | HEART RATE: 60 BPM | OXYGEN SATURATION: 99 % | HEIGHT: 62 IN | BODY MASS INDEX: 28.71 KG/M2 | SYSTOLIC BLOOD PRESSURE: 118 MMHG | WEIGHT: 156 LBS

## 2023-12-21 DIAGNOSIS — E03.8 HYPOTHYROIDISM DUE TO HASHIMOTO'S THYROIDITIS: ICD-10-CM

## 2023-12-21 DIAGNOSIS — E78.2 MIXED HYPERLIPIDEMIA: ICD-10-CM

## 2023-12-21 DIAGNOSIS — M75.01 ADHESIVE CAPSULITIS OF RIGHT SHOULDER: ICD-10-CM

## 2023-12-21 DIAGNOSIS — E06.3 HYPOTHYROIDISM DUE TO HASHIMOTO'S THYROIDITIS: ICD-10-CM

## 2023-12-21 DIAGNOSIS — K20.0 EOSINOPHILIC ESOPHAGITIS: ICD-10-CM

## 2023-12-21 DIAGNOSIS — M75.31 CALCIFIC TENDONITIS OF RIGHT SHOULDER: ICD-10-CM

## 2023-12-21 DIAGNOSIS — Z00.00 ANNUAL PHYSICAL EXAM: Primary | ICD-10-CM

## 2023-12-21 DIAGNOSIS — R09.A2 FOREIGN BODY SENSATION IN THROAT: ICD-10-CM

## 2023-12-21 DIAGNOSIS — I47.9 TACHYCARDIA, PAROXYSMAL (HCC): ICD-10-CM

## 2023-12-21 PROCEDURE — 99204 OFFICE O/P NEW MOD 45 MIN: CPT | Performed by: FAMILY MEDICINE

## 2023-12-21 NOTE — PROGRESS NOTES
BMI Counseling: Body mass index is 28.53 kg/m². The BMI is above normal. Nutrition recommendations include decreasing portion sizes, decreasing fast food intake, limiting drinks that contain sugar, moderation in carbohydrate intake, increasing intake of lean protein, reducing intake of saturated and trans fat and reducing intake of cholesterol. Exercise recommendations include moderate physical activity 150 minutes/week and exercising 3-5 times per week. No pharmacotherapy was ordered. Rationale for BMI follow-up plan is due to patient being overweight or obese.     Depression Screening and Follow-up Plan: Patient was screened for depression during today's encounter. They screened negative with a PHQ-2 score of 0.      Assessment/Plan:     Chronic Problems:  Tachycardia, paroxysmal (HCC)  Stable, asymptomatic continue to monitor      Visit Diagnosis:  Diagnoses and all orders for this visit:    Annual physical exam  -     Ambulatory Referral to Gynecology; Future    Foreign body sensation in throat  -     Ambulatory Referral to Family Practice    Calcific tendonitis of right shoulder  -     Ambulatory Referral to Family Practice    Adhesive capsulitis of right shoulder  -     Ambulatory Referral to Family Practice    Mixed hyperlipidemia  Comments:  Continue dietary modifications, obtain updated lipid profile  Orders:  -     CBC and differential; Future  -     Lipid Panel with Direct LDL reflex; Future  -     Vitamin D 25 hydroxy; Future    Hypothyroidism due to Hashimoto's thyroiditis  Comments:  Asymptomatic reviewed previous continue monitoring TSH and TPO  Orders:  -     Comprehensive metabolic panel; Future  -     TSH, 3rd generation; Future  -     Anti-microsomal antibody; Future    Tachycardia, paroxysmal (HCC)    Eosinophilic esophagitis  Comments:  Continue present care per GI          Subjective:    Patient ID: Jessica Kelley is a 53 y.o. female.    Here to establish  Last PCP md lee   Seen  "?      Past medical history  Hyperlipidemia  Graves' disease, last TSH equal  History palpitations tachycardia, seen cardiology  Calcific tendinitis  Anxiety  Last ER visit 11/13/2023 for sensation of foreign body throat, seen by GI Dr. Natarajan EGD completed diagnosis eosinophilic esophagitis    Soc hx      Children 14yr   Non smoker   [employer] social realty , [employer] supervisor           The following portions of the patient's history were reviewed and updated as appropriate: allergies, current medications, past family history, past medical history, past social history, past surgical history and problem list.    Review of Systems   Constitutional:  Negative for chills and fever.   HENT:  Negative for ear pain and sore throat.    Eyes:  Negative for pain and visual disturbance.   Respiratory:  Negative for cough and shortness of breath.    Cardiovascular:  Negative for chest pain and palpitations.   Gastrointestinal:  Negative for abdominal pain and vomiting.   Genitourinary:  Negative for dysuria and hematuria.   Musculoskeletal:  Negative for arthralgias and back pain.   Skin:  Negative for color change and rash.   Neurological:  Negative for seizures and syncope.   All other systems reviewed and are negative.        /70   Pulse 60   Ht 5' 2\" (1.575 m)   Wt 70.8 kg (156 lb)   SpO2 99%   BMI 28.53 kg/m²   Social History     Socioeconomic History    Marital status: /Civil Union     Spouse name: Not on file    Number of children: Not on file    Years of education: Not on file    Highest education level: Not on file   Occupational History    Not on file   Tobacco Use    Smoking status: Never    Smokeless tobacco: Never   Vaping Use    Vaping status: Never Used   Substance and Sexual Activity    Alcohol use: Yes     Comment: socially    Drug use: Never    Sexual activity: Not on file   Other Topics Concern    Not on file   Social History Narrative    Not on file     Social Determinants of Health "     Financial Resource Strain: Not on file   Food Insecurity: Not on file   Transportation Needs: Not on file   Physical Activity: Not on file   Stress: Not on file   Social Connections: Not on file   Intimate Partner Violence: Not on file   Housing Stability: Not on file     Past Medical History:   Diagnosis Date    Disease of thyroid gland      Family History   Problem Relation Age of Onset    COPD Mother     Diabetes Mother     Thyroid disease Paternal Aunt     Diabetes Maternal Grandmother      Past Surgical History:   Procedure Laterality Date     SECTION      FOOT SURGERY      HERNIA REPAIR      LIPOSUCTION         Current Outpatient Medications:     ALPRAZolam (XANAX) 0.5 mg tablet, Take 0.5 mg by mouth 2 (two) times a day as needed, Disp: , Rfl: 0    Diclofenac Sodium (VOLTAREN) 1 %, Apply 2 g topically 4 (four) times a day, Disp: 150 g, Rfl: 1    flunisolide (NASALIDE) 25 MCG/ACT (0.025%) SOLN, flunisolide 25 mcg (0.025 %) nasal spray, Disp: , Rfl:     Multiple Vitamins-Minerals (MULTIVITAMIN ADULT PO), 1 DAILY, Disp: , Rfl:     Ozempic, 0.25 or 0.5 MG/DOSE, 2 MG/3ML injection pen, INJECT 0.25MG SUBCUTANEOUSLY ONCE A WEEK, Disp: , Rfl:     pantoprazole (PROTONIX) 40 mg tablet, Take 1 tablet (40 mg total) by mouth daily, Disp: 90 tablet, Rfl: 1    fluticasone (Flovent Diskus) 250 mcg/actuation diskus inhaler, Inhale 1 puff 2 (two) times a day Rinse mouth after use., Disp: 180 blister, Rfl: 0    Allergies   Allergen Reactions    Diphenhydramine Tachycardia     Other reaction(s): Unknown  Other reaction(s): panic attack, swelling, hives      Amoxicillin Hives and Rash          Lab Review   Hospital Outpatient Visit on 2023   Component Date Value    Case Report 2023                      Value:Surgical Pathology Report                         Case: [id number]                                   Authorizing Provider:  Randy Natarajan III, MD   Collected:           2023 0739               Ordering Location:      [facility]       Received:            12/13/2023 1517                                     [facility]                                                             Pathologist:           Gage Mora MD                                                            Specimens:   A) - Small Bowel, NOS                                                                               B) - Stomach                                                                                        C) - Esophagus                                                                             Final Diagnosis 12/13/2023                      Value:This result contains rich text formatting which cannot be displayed here.    Note 12/13/2023                      Value:This result contains rich text formatting which cannot be displayed here.    Additional Information 12/13/2023                      Value:This result contains rich text formatting which cannot be displayed here.    Gross Description 12/13/2023                      Value:This result contains rich text formatting which cannot be displayed here.    Clinical Information 12/13/2023                      Value:Dysphagia  · Mild, generalized abnormal mucosa with linear furrows in the upper third of the esophagus, middle third of the esophagus and lower third of the esophagus; performed cold forceps biopsy to rule out eosinophilic esophagitis  · Grade A esophagitis with mucosal breaks measuring less than 5 mm not continuous between folds, covering less than 75% of the circumference in the GE junction  · The fundus of the stomach, body of the stomach, antrum, duodenal bulb, 1st part of the duodenum and 2nd part of the duodenum appeared normal. Performed random biopsy using biopsy forceps.  Cold bx r/o celiac        Imaging: EGD    Result Date: 12/13/2023  Narrative: Table formatting from the original result was not included.  [facility] [address] [facility] [id number] [phone] DATE OF SERVICE: 12/13/23 PHYSICIAN(S): Attending: Randy Natarajan III, MD Fellow: No Staff Documented INDICATION: Foreign body sensation in throat, Food impaction of esophagus, initial encounter POST-OP DIAGNOSIS: See the impression below. PREPROCEDURE: Informed consent was obtained for the procedure, including sedation.  Risks of perforation, hemorrhage, adverse drug reaction and aspiration were discussed. The patient was placed in the left lateral decubitus position. Patient was explained about the risks and benefits of the procedure. Risks including but not limited to bleeding, infection, and perforation were explained in detail. Also explained about less than 100% sensitivity with the exam and other alternatives. PROCEDURE: EGD DETAILS OF PROCEDURE: Patient was taken to the procedure room where a time out was performed to confirm correct patient and correct procedure. The patient underwent monitored anesthesia care, which was administered by an anesthesia professional. The patient's blood pressure, heart rate, level of consciousness, respirations and oxygen were monitored throughout the procedure. The scope was advanced to the second part of the duodenum. Retroflexion was performed in the fundus. The patient experienced no blood loss. The procedure was not difficult. The patient tolerated the procedure well. There were no apparent adverse events. ANESTHESIA INFORMATION: ASA: II Anesthesia Type: IV Sedation with Anesthesia MEDICATIONS: Totals unavailable because the procedure time range is not set FINDINGS: Mild, generalized abnormal mucosa with linear furrows in the upper third of the esophagus, middle third of the esophagus and lower third of the esophagus; performed cold forceps biopsy to rule out eosinophilic esophagitis Grade A esophagitis with mucosal breaks measuring less than 5 mm not continuous between folds, covering less than 75% of the  circumference in the GE junction The fundus of the stomach, body of the stomach, antrum, duodenal bulb, 1st part of the duodenum and 2nd part of the duodenum appeared normal. Performed random biopsy using biopsy forceps. SPECIMENS: ID Type Source Tests Collected by Time Destination 1 :  Tissue Small Bowel, NOS TISSUE EXAM Randy Natarajan III, MD 12/13/2023  7:39 AM  2 :  Tissue Stomach TISSUE EXAM Randy Natarajan III, MD 12/13/2023  7:39 AM      Impression: Linear furrows in the esophagus LA grade A reflux esophagitis RECOMMENDATION:  Await pathology results Continue Protonix course for 3 months Reflux precautions  Randy Natarajan III, MD       Objective:     Physical Exam  Constitutional:       General: She is not in acute distress.     Appearance: She is well-developed. She is not ill-appearing or toxic-appearing.   HENT:      Head: Normocephalic and atraumatic.   Eyes:      Conjunctiva/sclera: Conjunctivae normal.   Neck:      Vascular: No carotid bruit.   Cardiovascular:      Rate and Rhythm: Normal rate and regular rhythm.      Heart sounds: Normal heart sounds.   Pulmonary:      Effort: Pulmonary effort is normal.      Breath sounds: Normal breath sounds.   Musculoskeletal:         General: Normal range of motion.      Cervical back: Normal range of motion and neck supple.   Lymphadenopathy:      Cervical: No cervical adenopathy.   Skin:     General: Skin is warm and dry.      Findings: No lesion or rash.   Neurological:      General: No focal deficit present.      Mental Status: She is alert and oriented to person, place, and time.      Deep Tendon Reflexes: Reflexes are normal and symmetric.   Psychiatric:         Behavior: Behavior normal.         Thought Content: Thought content normal.         Judgment: Judgment normal.           There are no Patient Instructions on file for this visit.    ANSLEY Diego    Portions of the record may have been created with voice recognition software.  Occasional  "wrong word or \"sound a like\" substitutions may have occurred due to the inherent limitations of voice recognition software.  Read the chart carefully and recognize, using context, where substitutions have occurred.  "

## 2024-01-03 DIAGNOSIS — K20.0 EOSINOPHILIC ESOPHAGITIS: Primary | ICD-10-CM

## 2024-01-03 RX ORDER — BUDESONIDE AND FORMOTEROL FUMARATE DIHYDRATE 80; 4.5 UG/1; UG/1
2 AEROSOL RESPIRATORY (INHALATION) 2 TIMES DAILY
Qty: 10.2 G | Refills: 1 | Status: SHIPPED | OUTPATIENT
Start: 2024-01-03

## 2024-01-10 ENCOUNTER — TELEPHONE (OUTPATIENT)
Age: 54
End: 2024-01-10

## 2024-01-10 NOTE — PROGRESS NOTES
Patient called in stating that the 2 puffs a day was too much for her and wanted to cut back to once daily.    Please call and advise patient if this is okay.    #[phone]

## 2024-01-10 NOTE — TELEPHONE ENCOUNTER
Patient called in stating that she wanted to back down from 2 puffs to 1 puff on her Symbicort inhaler prescribed my Dr. Altamirano.    Please call and advise with any concerns.  #[phone]

## 2024-01-29 PROBLEM — M75.31 CALCIFIC TENDONITIS OF RIGHT SHOULDER: Status: RESOLVED | Noted: 2021-04-13 | Resolved: 2024-01-29

## 2024-01-29 PROBLEM — M25.532 LEFT WRIST PAIN: Status: ACTIVE | Noted: 2019-02-26

## 2024-01-29 PROBLEM — E03.9 HYPOTHYROID: Status: ACTIVE | Noted: 2017-11-22

## 2024-01-29 PROBLEM — M75.01 ADHESIVE CAPSULITIS OF RIGHT SHOULDER: Status: RESOLVED | Noted: 2021-03-09 | Resolved: 2024-01-29

## 2024-01-29 PROBLEM — M79.7 FIBROMYALGIA: Status: ACTIVE | Noted: 2017-11-22

## 2024-01-29 PROBLEM — M18.9 OSTEOARTHRITIS OF CARPOMETACARPAL (CMC) JOINT OF THUMB: Status: ACTIVE | Noted: 2019-02-26

## 2024-01-29 PROBLEM — R00.0 TACHYCARDIA: Status: ACTIVE | Noted: 2020-11-09

## 2024-01-29 PROBLEM — E55.9 VITAMIN D DEFICIENCY: Status: ACTIVE | Noted: 2017-11-22

## 2024-01-29 PROBLEM — E05.00 GRAVES DISEASE: Status: ACTIVE | Noted: 2021-12-06

## 2024-01-30 PROBLEM — J30.9 ALLERGIC RHINITIS: Status: ACTIVE | Noted: 2024-01-30

## 2024-02-07 ENCOUNTER — TELEPHONE (OUTPATIENT)
Dept: FAMILY MEDICINE CLINIC | Facility: CLINIC | Age: 54
End: 2024-02-07

## 2024-02-07 NOTE — TELEPHONE ENCOUNTER
----- Message from ANSLEY Diego sent at 2/7/2024  4:39 PM EST -----  Lipid panel mildy elevated , vit D low   Rec low chol diet , exercise program   Ensure pt taking vit d supplement 2000iu qd

## 2024-03-05 DIAGNOSIS — T18.128A FOOD IMPACTION OF ESOPHAGUS, INITIAL ENCOUNTER: ICD-10-CM
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
## 2024-03-05 RX ORDER — PANTOPRAZOLE SODIUM 40 MG/1
40 TABLET, DELAYED RELEASE ORAL DAILY
Qty: 90 TABLET | Refills: 1 | Status: SHIPPED | OUTPATIENT
Start: 2024-03-05

## 2024-03-11 ENCOUNTER — TELEPHONE (OUTPATIENT)
Age: 54
End: 2024-03-11

## 2024-03-11 NOTE — TELEPHONE ENCOUNTER
Patients GI provider:  Dr. Natarajan    Number to return call: [phone]    Reason for call: Pt calling to see if she need to follow up with Dr. Natarajan as she has been taking the Protonix as he directed and she states she follow up with an allergist and found out she was allergic to chicken. She wanted to know if she needed to follow up with Dr. Natarajan and continue the meds.    Scheduled procedure/appointment date if applicable: n/a

## 2024-03-11 NOTE — TELEPHONE ENCOUNTER
Spoke with patient. Pt reports that she cut out chicken from her diet completely and feels an improvement. She has not taken Pantoprazole for the last 2 days and completed the inhaler. Please advise if she needs to f/up in the office and/or continue the meds. Thanks.

## 2024-03-14 NOTE — TELEPHONE ENCOUNTER
She should be able to stop her meds as of now but just let her know that the condition can flare up again in the future where she may need the medications again so if she has recurrent symptoms, she should let us know.

## 2024-04-29 PROBLEM — Z91.018 FOOD ALLERGY: Status: ACTIVE | Noted: 2024-04-29

## 2024-05-16 ENCOUNTER — OFFICE VISIT (OUTPATIENT)
Dept: GASTROENTEROLOGY | Facility: CLINIC | Age: 54
End: 2024-05-16
Payer: COMMERCIAL

## 2024-05-16 VITALS
TEMPERATURE: 98 F | WEIGHT: 158.2 LBS | HEART RATE: 65 BPM | RESPIRATION RATE: 18 BRPM | DIASTOLIC BLOOD PRESSURE: 84 MMHG | SYSTOLIC BLOOD PRESSURE: 126 MMHG | HEIGHT: 62 IN | OXYGEN SATURATION: 99 % | BODY MASS INDEX: 29.11 KG/M2
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
## 2024-05-16 DIAGNOSIS — T18.128A FOOD IMPACTION OF ESOPHAGUS, INITIAL ENCOUNTER: ICD-10-CM

## 2024-05-16 DIAGNOSIS — K20.0 EOSINOPHILIC ESOPHAGITIS: Primary | ICD-10-CM

## 2024-05-16 PROCEDURE — 99213 OFFICE O/P EST LOW 20 MIN: CPT | Performed by: PHYSICIAN ASSISTANT

## 2024-05-16 NOTE — H&P (VIEW-ONLY)
[facility]  Jessica Kelley 53 y.o. female MRN: [id number]       CC: Martin    HPI: Jessica is a pleasant 53-year-old female with history of eosinophilic esophagitis diagnosed in 2023, Graves' disease, hypothyroidism, and fibromyalgia. Patient presents for follow-up as recommended by allergy.     Last EGD was performed in December 2023 revealing reflux esophagitis and linear furrows consistent with eosinophilic esophagitis.  Biopsy did confirm eosinophilic esophagitis as there were up to 70 eosinophils seen on biopsy. She then went to see allergy, who performed skin prick testing revealing a potential allergy to chicken. Patient reports that in retrospect, she thinks this may be true as she experienced facial swelling and throat irritation when eating chicken.  Patient's allergist suggested that patient return back to the office for repeat endoscopy.  Patient reports that she stopped eating chicken about 3 to 4 months ago.  She completed a 3-month course of pantoprazole.  She denies overt reflux symptoms.  Patient denies any severe episodes of dysphagia since her diagnosis.    It was previously documented that patient had a colonoscopy at age 50 that was normal.    Review of Systems:    CONSTITUTIONAL: Denies any fever, chills, or rigors. Good appetite, and no recent weight loss.  HEENT: No earache or tinnitus. Denies hearing loss or visual disturbances.  CARDIOVASCULAR: No chest pain or palpitations.   RESPIRATORY: Denies any cough, hemoptysis, shortness of breath or dyspnea on exertion.  GASTROINTESTINAL: As noted in the History of Present Illness.   GENITOURINARY: No problems with urination. Denies any hematuria or dysuria.  NEUROLOGIC: No dizziness or vertigo, denies headaches.   MUSCULOSKELETAL: Denies any muscle or joint pain.   SKIN: Denies skin rashes or itching.   ENDOCRINE: Denies excessive thirst. Denies intolerance to heat or cold.  PSYCHOSOCIAL: Denies depression or anxiety.  Denies any recent memory loss.       Current Outpatient Medications   Medication Sig Dispense Refill    ALPRAZolam (XANAX) 0.5 mg tablet Take 0.5 mg by mouth 2 (two) times a day as needed  0    flunisolide (NASALIDE) 25 MCG/ACT (0.025%) SOLN flunisolide 25 mcg (0.025 %) nasal spray      Multiple Vitamins-Minerals (MULTIVITAMIN ADULT PO) 1 DAILY      EPINEPHrine (EPIPEN) 0.3 mg/0.3 mL SOAJ Inject 0.3 mL (0.3 mg total) into a muscle once for 1 dose 0.6 mL 0     No current facility-administered medications for this visit.     Past Medical History:   Diagnosis Date    Disease of thyroid gland      Past Surgical History:   Procedure Laterality Date     SECTION      FOOT SURGERY      HERNIA REPAIR      LIPOSUCTION       Social History     Socioeconomic History    Marital status: /Civil Union     Spouse name: None    Number of children: None    Years of education: None    Highest education level: None   Occupational History    None   Tobacco Use    Smoking status: Never     Passive exposure: Never    Smokeless tobacco: Never   Vaping Use    Vaping status: Never Used   Substance and Sexual Activity    Alcohol use: Yes     Comment: socially    Drug use: Never    Sexual activity: None   Other Topics Concern    None   Social History Narrative    None     Social Determinants of Health     Financial Resource Strain: Not on file   Food Insecurity: Not on file   Transportation Needs: Not on file   Physical Activity: Not on file   Stress: Not on file   Social Connections: Not on file   Intimate Partner Violence: Not on file   Housing Stability: Not on file     Family History   Problem Relation Age of Onset    COPD Mother     Diabetes Mother     Thyroid disease Paternal Aunt     Diabetes Maternal Grandmother             PHYSICAL EXAM:    Vitals:    24 1202   BP: 126/84   BP Location: Left arm   Patient Position: Sitting   Pulse: 65   Resp: 18   Temp: 98 °F (36.7 °C)   TempSrc: Tympanic   SpO2: 99%   Weight: 71.8  "kg (158 lb 3.2 oz)   Height: 5' 2\" (1.575 m)     General Appearance:   Alert and oriented x 3. Cooperative, and in no respiratory distress   HEENT:   Normocephalic, atraumatic, anicteric.     Neck:  Supple, symmetrical, trachea midline   Lungs:   Clear to auscultation bilaterally    Heart::   Regular rate and rhythm   Abdomen:   Soft, non-tender, non-distended; normal bowel sounds; no masses, no organomegaly    Genitalia:   Deferred    Rectal:   Deferred    Extremities:  No cyanosis, clubbing or edema    Pulses:  2+ and symmetric all extremities    Skin:  Skin color, texture, turgor normal, no rashes or lesions    Lymph nodes:  No palpable cervical or supraclavicular lymphadenopathy        Lab Results:       Results from last 6 Months   Lab Units 02/01/24  0623   POTASSIUM mmol/L 4.4   CHLORIDE mmol/L 104   CO2 mmol/L 26   BUN mg/dL 12   CREATININE mg/dL 0.91   CALCIUM mg/dL 9.3   ALK PHOS U/L 63   ALT U/L 14   AST U/L 16               Imaging Studies:   EGD    Result Date: 12/13/2023  Impression: Linear furrows in the esophagus LA grade A reflux esophagitis RECOMMENDATION:  Await pathology results Continue Protonix course for 3 months Reflux precautions  Randy Natarajan III, MD       ASSESSMENT and PLAN:      1) Eosinophilic esophagitis - Patient was treated with Flovent and PPI course initially. She eliminated chicken from her diet 3 to 4 months ago.  Per allergist no, could be a false positive.  However, the patient believes that this could be true for her.  We appreciate the recommendations.  We discussed that EoE can be mediated by allergies vs reflux. Fortunately, she is overall doing well without major issues with dysphagia. We discussed that guidelines do recommend a repeat EGD for biopsy to ensure the eosinophils on biopsy resolve. She agrees with plan. We discussed risks and benefits of EGD. She would like to proceed with scheduling.  In the event that her eosinophilic esophagitis is reflux mediated, she " "may benefit from staying on the lowest dose of PPI to control her symptoms.  We discussed chronic risks of PPI usage as well.       Follow up after EGD.      Portions of the record may have been created with voice recognition software.  Occasional wrong word or \"sound a like\" substitutions may have occurred due to the inherent limitations of voice recognition software.  Read the chart carefully and recognize, using context, where substitutions have occurred.  "

## 2024-05-16 NOTE — PATIENT INSTRUCTIONS
Scheduled date of EGD(as of today):5/30/24  Physician performing EGD:Delgado  Location of EGD:[place]  Instructions reviewed with patient by:Sasha VIVEROS  Clearances:  none

## 2024-05-16 NOTE — PROGRESS NOTES
[facility]  Jessica Kelley 53 y.o. female MRN: [id number]       CC: Martin    HPI: Jessica is a pleasant 53-year-old female with history of eosinophilic esophagitis diagnosed in 2023, Graves' disease, hypothyroidism, and fibromyalgia. Patient presents for follow-up as recommended by allergy.     Last EGD was performed in December 2023 revealing reflux esophagitis and linear furrows consistent with eosinophilic esophagitis.  Biopsy did confirm eosinophilic esophagitis as there were up to 70 eosinophils seen on biopsy. She then went to see allergy, who performed skin prick testing revealing a potential allergy to chicken. Patient reports that in retrospect, she thinks this may be true as she experienced facial swelling and throat irritation when eating chicken.  Patient's allergist suggested that patient return back to the office for repeat endoscopy.  Patient reports that she stopped eating chicken about 3 to 4 months ago.  She completed a 3-month course of pantoprazole.  She denies overt reflux symptoms.  Patient denies any severe episodes of dysphagia since her diagnosis.    It was previously documented that patient had a colonoscopy at age 50 that was normal.    Review of Systems:    CONSTITUTIONAL: Denies any fever, chills, or rigors. Good appetite, and no recent weight loss.  HEENT: No earache or tinnitus. Denies hearing loss or visual disturbances.  CARDIOVASCULAR: No chest pain or palpitations.   RESPIRATORY: Denies any cough, hemoptysis, shortness of breath or dyspnea on exertion.  GASTROINTESTINAL: As noted in the History of Present Illness.   GENITOURINARY: No problems with urination. Denies any hematuria or dysuria.  NEUROLOGIC: No dizziness or vertigo, denies headaches.   MUSCULOSKELETAL: Denies any muscle or joint pain.   SKIN: Denies skin rashes or itching.   ENDOCRINE: Denies excessive thirst. Denies intolerance to heat or cold.  PSYCHOSOCIAL: Denies depression or anxiety.  Denies any recent memory loss.       Current Outpatient Medications   Medication Sig Dispense Refill    ALPRAZolam (XANAX) 0.5 mg tablet Take 0.5 mg by mouth 2 (two) times a day as needed  0    flunisolide (NASALIDE) 25 MCG/ACT (0.025%) SOLN flunisolide 25 mcg (0.025 %) nasal spray      Multiple Vitamins-Minerals (MULTIVITAMIN ADULT PO) 1 DAILY      EPINEPHrine (EPIPEN) 0.3 mg/0.3 mL SOAJ Inject 0.3 mL (0.3 mg total) into a muscle once for 1 dose 0.6 mL 0     No current facility-administered medications for this visit.     Past Medical History:   Diagnosis Date    Disease of thyroid gland      Past Surgical History:   Procedure Laterality Date     SECTION      FOOT SURGERY      HERNIA REPAIR      LIPOSUCTION       Social History     Socioeconomic History    Marital status: /Civil Union     Spouse name: None    Number of children: None    Years of education: None    Highest education level: None   Occupational History    None   Tobacco Use    Smoking status: Never     Passive exposure: Never    Smokeless tobacco: Never   Vaping Use    Vaping status: Never Used   Substance and Sexual Activity    Alcohol use: Yes     Comment: socially    Drug use: Never    Sexual activity: None   Other Topics Concern    None   Social History Narrative    None     Social Determinants of Health     Financial Resource Strain: Not on file   Food Insecurity: Not on file   Transportation Needs: Not on file   Physical Activity: Not on file   Stress: Not on file   Social Connections: Not on file   Intimate Partner Violence: Not on file   Housing Stability: Not on file     Family History   Problem Relation Age of Onset    COPD Mother     Diabetes Mother     Thyroid disease Paternal Aunt     Diabetes Maternal Grandmother             PHYSICAL EXAM:    Vitals:    24 1202   BP: 126/84   BP Location: Left arm   Patient Position: Sitting   Pulse: 65   Resp: 18   Temp: 98 °F (36.7 °C)   TempSrc: Tympanic   SpO2: 99%   Weight: 71.8  "kg (158 lb 3.2 oz)   Height: 5' 2\" (1.575 m)     General Appearance:   Alert and oriented x 3. Cooperative, and in no respiratory distress   HEENT:   Normocephalic, atraumatic, anicteric.     Neck:  Supple, symmetrical, trachea midline   Lungs:   Clear to auscultation bilaterally    Heart::   Regular rate and rhythm   Abdomen:   Soft, non-tender, non-distended; normal bowel sounds; no masses, no organomegaly    Genitalia:   Deferred    Rectal:   Deferred    Extremities:  No cyanosis, clubbing or edema    Pulses:  2+ and symmetric all extremities    Skin:  Skin color, texture, turgor normal, no rashes or lesions    Lymph nodes:  No palpable cervical or supraclavicular lymphadenopathy        Lab Results:       Results from last 6 Months   Lab Units 02/01/24  0623   POTASSIUM mmol/L 4.4   CHLORIDE mmol/L 104   CO2 mmol/L 26   BUN mg/dL 12   CREATININE mg/dL 0.91   CALCIUM mg/dL 9.3   ALK PHOS U/L 63   ALT U/L 14   AST U/L 16               Imaging Studies:   EGD    Result Date: 12/13/2023  Impression: Linear furrows in the esophagus LA grade A reflux esophagitis RECOMMENDATION:  Await pathology results Continue Protonix course for 3 months Reflux precautions  Randy Natarajan III, MD       ASSESSMENT and PLAN:      1) Eosinophilic esophagitis - Patient was treated with Flovent and PPI course initially. She eliminated chicken from her diet 3 to 4 months ago.  Per allergist no, could be a false positive.  However, the patient believes that this could be true for her.  We appreciate the recommendations.  We discussed that EoE can be mediated by allergies vs reflux. Fortunately, she is overall doing well without major issues with dysphagia. We discussed that guidelines do recommend a repeat EGD for biopsy to ensure the eosinophils on biopsy resolve. She agrees with plan. We discussed risks and benefits of EGD. She would like to proceed with scheduling.  In the event that her eosinophilic esophagitis is reflux mediated, she " "may benefit from staying on the lowest dose of PPI to control her symptoms.  We discussed chronic risks of PPI usage as well.       Follow up after EGD.      Portions of the record may have been created with voice recognition software.  Occasional wrong word or \"sound a like\" substitutions may have occurred due to the inherent limitations of voice recognition software.  Read the chart carefully and recognize, using context, where substitutions have occurred.  "

## 2024-05-30 ENCOUNTER — HOSPITAL ENCOUNTER (OUTPATIENT)
Dept: GASTROENTEROLOGY | Facility: HOSPITAL | Age: 54
Setting detail: OUTPATIENT SURGERY
Discharge: HOME | End: 2024-05-30
Payer: COMMERCIAL

## 2024-05-30 ENCOUNTER — ANESTHESIA (OUTPATIENT)
Dept: GASTROENTEROLOGY | Facility: HOSPITAL | Age: 54
End: 2024-05-30

## 2024-05-30 ENCOUNTER — ANESTHESIA EVENT (OUTPATIENT)
Dept: GASTROENTEROLOGY | Facility: HOSPITAL | Age: 54
End: 2024-05-30

## 2024-05-30 VITALS
HEART RATE: 65 BPM | RESPIRATION RATE: 18 BRPM | OXYGEN SATURATION: 100 % | DIASTOLIC BLOOD PRESSURE: 80 MMHG | HEIGHT: 62 IN | SYSTOLIC BLOOD PRESSURE: 120 MMHG | BODY MASS INDEX: 29.17 KG/M2 | TEMPERATURE: 97.7 F | WEIGHT: 158.51 LBS

## 2024-05-30 DIAGNOSIS — K20.0 EOSINOPHILIC ESOPHAGITIS: ICD-10-CM

## 2024-05-30 PROCEDURE — 88312 SPECIAL STAINS GROUP 1: CPT | Performed by: PATHOLOGY

## 2024-05-30 PROCEDURE — 88342 IMHCHEM/IMCYTCHM 1ST ANTB: CPT | Performed by: PATHOLOGY

## 2024-05-30 PROCEDURE — 88313 SPECIAL STAINS GROUP 2: CPT | Performed by: PATHOLOGY

## 2024-05-30 PROCEDURE — 88305 TISSUE EXAM BY PATHOLOGIST: CPT | Performed by: PATHOLOGY

## 2024-05-30 PROCEDURE — 43239 EGD BIOPSY SINGLE/MULTIPLE: CPT | Performed by: INTERNAL MEDICINE

## 2024-05-30 RX ORDER — PROPOFOL 10 MG/ML
INJECTION, EMULSION INTRAVENOUS AS NEEDED
Status: DISCONTINUED | OUTPATIENT
Start: 2024-05-30 | End: 2024-05-30

## 2024-05-30 RX ORDER — PANTOPRAZOLE SODIUM 40 MG/1
40 TABLET, DELAYED RELEASE ORAL DAILY
Qty: 30 TABLET | Refills: 2 | Status: SHIPPED | OUTPATIENT
Start: 2024-05-30

## 2024-05-30 RX ORDER — SODIUM CHLORIDE, SODIUM LACTATE, POTASSIUM CHLORIDE, CALCIUM CHLORIDE 600; 310; 30; 20 MG/100ML; MG/100ML; MG/100ML; MG/100ML
INJECTION, SOLUTION INTRAVENOUS CONTINUOUS PRN
Status: DISCONTINUED | OUTPATIENT
Start: 2024-05-30 | End: 2024-05-30

## 2024-05-30 RX ORDER — LIDOCAINE HYDROCHLORIDE 20 MG/ML
INJECTION, SOLUTION EPIDURAL; INFILTRATION; INTRACAUDAL; PERINEURAL AS NEEDED
Status: DISCONTINUED | OUTPATIENT
Start: 2024-05-30 | End: 2024-05-30

## 2024-05-30 RX ADMIN — PROPOFOL 50 MG: 10 INJECTION, EMULSION INTRAVENOUS at 09:25

## 2024-05-30 RX ADMIN — SODIUM CHLORIDE, SODIUM LACTATE, POTASSIUM CHLORIDE, AND CALCIUM CHLORIDE: .6; .31; .03; .02 INJECTION, SOLUTION INTRAVENOUS at 08:47

## 2024-05-30 RX ADMIN — PROPOFOL 150 MG: 10 INJECTION, EMULSION INTRAVENOUS at 09:24

## 2024-05-30 RX ADMIN — PROPOFOL 50 MG: 10 INJECTION, EMULSION INTRAVENOUS at 09:26

## 2024-05-30 RX ADMIN — LIDOCAINE HYDROCHLORIDE 100 MG: 20 INJECTION, SOLUTION EPIDURAL; INFILTRATION; INTRACAUDAL; PERINEURAL at 09:24

## 2024-05-30 NOTE — ANESTHESIA PREPROCEDURE EVALUATION
Procedure:  EGD    Relevant Problems   CARDIO   (+) Tachycardia, paroxysmal (HCC)      ENDO   (+) Hypothyroid      MUSCULOSKELETAL   (+) Fibromyalgia   (+) Osteoarthritis of carpometacarpal (CMC) joint of thumb      NEURO/PSYCH   (+) Fibromyalgia        Physical Exam    Airway    Mallampati score: I  TM Distance: >3 FB  Neck ROM: full     Dental       Cardiovascular      Pulmonary      Other Findings  post-pubertal.      Anesthesia Plan  ASA Score- 2     Anesthesia Type- IV sedation with anesthesia with ASA Monitors.         Additional Monitors:     Airway Plan:            Plan Factors-Exercise tolerance (METS): >4 METS.    Chart reviewed.   Existing labs reviewed. Patient summary reviewed.                  Induction- intravenous.    Postoperative Plan-         Informed Consent- Anesthetic plan and risks discussed with patient.  I personally reviewed this patient with the CRNA. Discussed and agreed on the Anesthesia Plan with the CRNA..

## 2024-05-30 NOTE — ANESTHESIA POSTPROCEDURE EVALUATION
Post-Op Assessment Note    CV Status:  Stable    Pain management: adequate       Mental Status:  Alert and awake   Hydration Status:  Euvolemic   PONV Controlled:  Controlled   Airway Patency:  Patent     Post Op Vitals Reviewed: Yes    No anethesia notable event occurred.    Staff: BENNETT               /72 (05/30/24 0932)    Temp 97.7 °F (36.5 °C) (05/30/24 0932)    Pulse 66 (05/30/24 0932)   Resp 18 (05/30/24 0932)    SpO2 97 % (05/30/24 0932)

## 2024-05-30 NOTE — INTERVAL H&P NOTE
H&P reviewed. After examining the patient I find no changes in the patients condition since the H&P had been written.    Vitals:    05/30/24 0835   BP: 154/82   Pulse: 67   Resp: 16   Temp: 98 °F (36.7 °C)   SpO2: 100%

## 2024-06-04 ENCOUNTER — TELEPHONE (OUTPATIENT)
Age: 54
End: 2024-06-04

## 2024-06-04 ENCOUNTER — TELEPHONE (OUTPATIENT)
Dept: GASTROENTEROLOGY | Facility: CLINIC | Age: 54
End: 2024-06-04

## 2024-06-04 DIAGNOSIS — K20.0 EOSINOPHILIC ESOPHAGITIS: Primary | ICD-10-CM

## 2024-06-04 PROCEDURE — 88312 SPECIAL STAINS GROUP 1: CPT | Performed by: PATHOLOGY

## 2024-06-04 PROCEDURE — 88342 IMHCHEM/IMCYTCHM 1ST ANTB: CPT | Performed by: PATHOLOGY

## 2024-06-04 PROCEDURE — 88305 TISSUE EXAM BY PATHOLOGIST: CPT | Performed by: PATHOLOGY

## 2024-06-04 PROCEDURE — 88313 SPECIAL STAINS GROUP 2: CPT | Performed by: PATHOLOGY

## 2024-06-04 NOTE — TELEPHONE ENCOUNTER
Called [facility] pharmacy and spoke to LEONARD. She stated that Dr stratton called in a script for a medication but it was on the VM line and all pharmacist could get out of message that it ws for 6ml/ checked in notes and do not see medication listed that he called in today. Will fwd a message to alex during hospital rounds ans well as patient question has.

## 2024-06-04 NOTE — TELEPHONE ENCOUNTER
Patient called back wanting to let Dr. Natarajan know that [place] and she will start the medication once she return and hope that is okay.

## 2024-06-04 NOTE — TELEPHONE ENCOUNTER
Pharm calling stating that dr jimenez called in script to pharm for pt. Pharm needs call back to go over script please advise.

## 2024-06-05 NOTE — TELEPHONE ENCOUNTER
Called patient and got WERNER SUMMERS with message from alex the name of the medication and that she will clarify the medication with the pharmacy  and that it is ok to start the medication when she comes back. Left office # as well

## 2024-06-05 NOTE — TELEPHONE ENCOUNTER
It is okay for her to start the medicine when she is back! It looks like he wants her to do a budesonide liquid which is a steroid liquid. I will call them again and clarify the script so she can get it!

## 2024-06-20 ENCOUNTER — APPOINTMENT (OUTPATIENT)
Dept: LAB | Facility: HOSPITAL | Age: 54
End: 2024-06-20
Payer: COMMERCIAL

## 2024-06-20 ENCOUNTER — TELEPHONE (OUTPATIENT)
Age: 54
End: 2024-06-20

## 2024-06-20 DIAGNOSIS — K20.0 EOSINOPHILIC ESOPHAGITIS: Primary | ICD-10-CM

## 2024-06-20 LAB — IGA SERPL-MCNC: 382 MG/DL (ref 66–433)

## 2024-06-20 PROCEDURE — 36415 COLL VENOUS BLD VENIPUNCTURE: CPT

## 2024-06-20 PROCEDURE — 82784 ASSAY IGA/IGD/IGG/IGM EACH: CPT

## 2024-06-20 PROCEDURE — 86364 TISS TRNSGLTMNASE EA IG CLAS: CPT

## 2024-06-20 PROCEDURE — 86258 DGP ANTIBODY EACH IG CLASS: CPT

## 2024-06-20 NOTE — TELEPHONE ENCOUNTER
Patients GI provider: ROSEMARY Monteiro    Number to return call: [phone]    Reason for call: Pt called asking for medication to be sent to pharmacy.     Scheduled procedure/appointment date if applicable: N/A

## 2024-06-20 NOTE — TELEPHONE ENCOUNTER
I called [facility] pharmacy a few weeks ago before she left for vacation and called in the budesonide liquid medication for her. Can someone call there and see if they have it for her?

## 2024-06-21 LAB
GLIADIN PEPTIDE IGA SER-ACNC: 0.5 U/ML
GLIADIN PEPTIDE IGA SER-ACNC: NEGATIVE
GLIADIN PEPTIDE IGG SER-ACNC: <0.4 U/ML
GLIADIN PEPTIDE IGG SER-ACNC: NEGATIVE
TTG IGA SER-ACNC: <0.5 U/ML
TTG IGA SER-ACNC: NEGATIVE
TTG IGG SER-ACNC: <0.8 U/ML
TTG IGG SER-ACNC: NEGATIVE

## 2024-06-21 NOTE — TELEPHONE ENCOUNTER
Called [facility] and the [facility] to check the status of medication Budesonide and the pharmacist states that they have the medication but is not coved by pt's insurance. The co-pay will be $362

## 2024-06-23 DIAGNOSIS — K20.0 EOSINOPHILIC ESOPHAGITIS: ICD-10-CM

## 2024-06-24 RX ORDER — PANTOPRAZOLE SODIUM 40 MG/1
40 TABLET, DELAYED RELEASE ORAL DAILY
Qty: 90 TABLET | Refills: 1 | Status: SHIPPED | OUTPATIENT
Start: 2024-06-24

## 2024-06-24 NOTE — TELEPHONE ENCOUNTER
There is no script for the budesonide liquid , the pa team would need a script to do the pa.     Routing to GI office    Called [facility] pharmacy.  Budesonide 2 mg slurry  Give 2 mg once daily for 8 weeks  4 ml is 2 mg  For 56 days  224 mg for 56 days  120 ml for 30 days    PA for budesonide liquid slurry    Submitted via    []CM[employer]-KEY   [][employer]-Case ID #   [x]Faxed to [employer]   []Other website   []Phone call Case ID #     Office notes sent, clinical questions answered. Awaiting determination    Turnaround time for your insurance to make a decision on your Prior Authorization can take 7-21 business days.

## 2024-06-25 ENCOUNTER — TELEPHONE (OUTPATIENT)
Dept: GASTROENTEROLOGY | Facility: CLINIC | Age: 54
End: 2024-06-25

## 2024-06-25 DIAGNOSIS — K20.0 EOSINOPHILIC ESOPHAGITIS: ICD-10-CM

## 2024-06-25 DIAGNOSIS — K20.0 EOSINOPHILIC ESOPHAGITIS: Primary | ICD-10-CM

## 2024-06-25 NOTE — TELEPHONE ENCOUNTER
Rcvd fax from [facility], pa has been assigned  AUTH- [id number]  Scanned in media

## 2024-06-25 NOTE — TELEPHONE ENCOUNTER
Called [facility] and states that medication was denied and needs a new script with this details below    Budesonide 2 mg slurry  Give 2 mg once daily for 8 weeks  4 ml is 2 mg  For 56 days  224 mg for 56 days  120 ml for 30 days    Can't find the right script.  Routing to PA     Thank You!

## 2024-06-25 NOTE — TELEPHONE ENCOUNTER
"Rcvd fax from [facility]   PA has not been submitted.  Scanned in media    \"The compounded product requested is available as a commercially available product.\"     Routing to GI office    "

## 2024-06-26 ENCOUNTER — TELEPHONE (OUTPATIENT)
Age: 54
End: 2024-06-26

## 2024-06-26 NOTE — TELEPHONE ENCOUNTER
Resubmitted pa for budesonide susp with corrected script     PA for budesonide susp    Submitted via    []CMM-KEY   [][employer]-Case ID #   [x]Faxed to [facility]   []Other website   []Phone call Case ID #     Office notes sent, clinical questions answered. Awaiting determination    Turnaround time for your insurance to make a decision on your Prior Authorization can take 7-21 business days.

## 2024-06-27 ENCOUNTER — NURSE TRIAGE (OUTPATIENT)
Age: 54
End: 2024-06-27

## 2024-06-27 NOTE — TELEPHONE ENCOUNTER
"Pt called regarding Budesonide (see other encounters). She was unsure pharmacy received Rx. Advised it requires PA and submitted PA today.    Pt then mentioned she was off pantoprazole for a couple of weeks. Re-started and noticed dry mouth and eyes. Noted this when she first started. Pt not concerned but wanted to make provider aware.     Additionally she is looking for clarification on whether she will need to take both pantoprazole and Budesonide. Pantoprazole was 3 month course per last OV (prior to EGD).    Please advise.    Answer Assessment - Initial Assessment Questions  1. NAME of MEDICATION: \"What medicine are you calling about?\"      Protonix and Budesonide  2. QUESTION: \"What is your question?\" (e.g., medication refill, side effect)      Directions  3. PRESCRIBING HCP: \"Who prescribed it?\" Reason: if prescribed by specialist, call should be referred to that group.      GI    Protocols used: Medication Question Call-ADULT-[place]    "

## 2024-06-27 NOTE — TELEPHONE ENCOUNTER
Pt called regarding medication. She was away on vacation; unsure if pharmacy received Rx. Advised it requires PA and submitted PA today.    Pt then mentioned she was off pantoprazole for a couple of weeks Re-started and noticed dry mouth and eyes. Noted this when she first started. Pt not concerned but wanted to make provider aware. Additionally she is looking for clarification on whether she will need to take both pantoprazole and Budesonide. Pantoprazole was 3 month course per last OV (prior to EGD).    Please advise.

## 2024-06-27 NOTE — TELEPHONE ENCOUNTER
Godfrey Braxton, please let the patient know that the budesonide is to help the patient get out of a flare of her eosinophilic esophagitis.  But her maintenance medication would be the pantoprazole to take at the lowest dose to control her symptoms and to help prevent flareups in the future.  The budesonide is just temporary.  She can take both.  Thank you so much!

## 2024-07-01 ENCOUNTER — TELEPHONE (OUTPATIENT)
Dept: GASTROENTEROLOGY | Facility: CLINIC | Age: 54
End: 2024-07-01

## 2024-07-01 NOTE — TELEPHONE ENCOUNTER
I called patient to relay the results- no answer, no voicemail.          Randy Natarajan III, MD  6/24/2024  7:08 PM EDT       Godfrey Lopez-     The lab test for celiac disease is negative

## 2024-07-02 NOTE — TELEPHONE ENCOUNTER
I called pt, left detailed msg that I was calling to review her results ordered by Dr Natarajan. Also, that her results are in her MYCHART if she prefers to review them that way instead

## 2024-07-05 NOTE — TELEPHONE ENCOUNTER
Patients GI provider:  PA: Thania     Number to return call: [phone]    Reason for call: Pt calling stating she received a letter regarding calling in for results. Patient can be reached at the above number provided     Scheduled procedure/appointment date if applicable: Apt/procedure

## 2024-09-09 ENCOUNTER — CONSULT (OUTPATIENT)
Dept: OBGYN CLINIC | Facility: CLINIC | Age: 54
End: 2024-09-09
Payer: COMMERCIAL

## 2024-09-09 VITALS
WEIGHT: 163 LBS | BODY MASS INDEX: 30 KG/M2 | DIASTOLIC BLOOD PRESSURE: 80 MMHG | SYSTOLIC BLOOD PRESSURE: 122 MMHG | HEIGHT: 62 IN

## 2024-09-09 DIAGNOSIS — Z78.0 ASYMPTOMATIC POSTMENOPAUSAL STATUS: ICD-10-CM

## 2024-09-09 DIAGNOSIS — R92.30 DENSE BREASTS: ICD-10-CM

## 2024-09-09 DIAGNOSIS — Z12.31 SCREENING MAMMOGRAM FOR BREAST CANCER: ICD-10-CM

## 2024-09-09 DIAGNOSIS — Z01.419 ENCOUNTER FOR GYNECOLOGICAL EXAMINATION (GENERAL) (ROUTINE) WITHOUT ABNORMAL FINDINGS: Primary | ICD-10-CM

## 2024-09-09 PROBLEM — R00.0 TACHYCARDIA: Status: RESOLVED | Noted: 2020-11-09 | Resolved: 2024-09-09

## 2024-09-09 PROCEDURE — S0612 ANNUAL GYNECOLOGICAL EXAMINA: HCPCS | Performed by: NURSE PRACTITIONER

## 2024-09-09 NOTE — PROGRESS NOTES
Diagnoses and all orders for this visit:    Encounter for gynecological examination (general) (routine) without abnormal findings  -     Ambulatory Referral to Gynecology    Asymptomatic postmenopausal status    Dense breasts  -     US breast screening bilateral complete (ABUS); Future    Screening mammogram for breast cancer  -     Mammo screening bilateral w 3d and cad; Future    Call as needed, encouraged calcium/vit D in her diet, call with any PMB, all questions answered          Pleasant 53 y.o. 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 She denies postmenopausal bleeding. She has a history of abnormal pap smears many years ago, last Pap 2023 neg/neg. Multiple negative Paps in Care Everywhere noted. A pap was NOT done today. She denies vaginal issues. She denies pelvic pain. She denies postmenopausal issues. She is sexually active. Last colonoscopy done 2021, due again in 10 years per pt report. Last mammogram 11/15/2023 BR1 normal, [facility]: 13.86 % . She has dense breasts. ABUS ordered for dense breasts, pt instructed to check on insurance coverage for this test. Advised to do 6 months after her mammogram for continued surveillance. Patient agrees with this plan. She is also thinking about getting a breast reduction due back and shoulder pain.      Past Medical History:   Diagnosis Date    Disease of thyroid gland      Past Surgical History:   Procedure Laterality Date     SECTION      FOOT SURGERY      HERNIA REPAIR      LIPOSUCTION      MYOMECTOMY      Small one noticed when going through IVF- it was removed     Family History   Problem Relation Age of Onset    COPD Mother     Diabetes Mother         Type 2    Heart disease Mother         Smoker Coronary Artery Disease    Seizures Mother         May have been due to medication    Thyroid disease Paternal Aunt     Diabetes Maternal Grandmother         Type 1    Heart disease Brother         Coronary Artery Disease  "    Social History     Tobacco Use    Smoking status: Former     Current packs/day: 0.00     Average packs/day: 1 pack/day for 15.0 years (15.0 ttl pk-yrs)     Types: Cigarettes     Quit date: 2009     Years since quitting: 15.5     Passive exposure: Never    Smokeless tobacco: Never   Vaping Use    Vaping status: Never Used   Substance Use Topics    Alcohol use: Yes     Comment: Socially    Drug use: Never       Current Outpatient Medications:     ALPRAZolam (XANAX) 0.5 mg tablet, Take 0.5 mg by mouth 2 (two) times a day as needed, Disp: , Rfl: 0    flunisolide (NASALIDE) 25 MCG/ACT (0.025%) SOLN, flunisolide 25 mcg (0.025 %) nasal spray, Disp: , Rfl:     Multiple Vitamins-Minerals (MULTIVITAMIN ADULT PO), 1 DAILY, Disp: , Rfl:     EPINEPHrine (EPIPEN) 0.3 mg/0.3 mL SOAJ, Inject 0.3 mL (0.3 mg total) into a muscle once for 1 dose, Disp: 0.6 mL, Rfl: 0  Patient Active Problem List    Diagnosis Date Noted    Food allergy 2024    Allergic rhinitis 2024    Tachycardia, paroxysmal (HCC) 2023    Eosinophilic esophagitis 2023    Graves disease 2021    Left wrist pain 2019    Osteoarthritis of carpometacarpal (CMC) joint of thumb 2019    Fibromyalgia 2017    Hypothyroid 2017    Vitamin D deficiency 2017       Allergies   Allergen Reactions    Diphenhydramine Tachycardia     Other reaction(s): Unknown  Other reaction(s): panic attack, swelling, hives      Amoxicillin Hives and Rash    Chicken Allergy - Food Allergy Eye Swelling, Facial Swelling, Fatigue, Sneezing, Swelling, Throat Swelling and Tongue Swelling       OB History    Para Term  AB Living   1 1       1   SAB IAB Ectopic Multiple Live Births                  # Outcome Date GA Lbr Damon/2nd Weight Sex Type Anes PTL Lv   1 Para               Obstetric Comments   1 x       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    Review of Systems   Constitutional: Negative for chills, fatigue, fever and unexpected weight change.   Respiratory: Negative for shortness of breath.    Gastrointestinal: Negative for anal bleeding, blood in stool, constipation and diarrhea.   Genitourinary: Negative for difficulty urinating, dysuria and hematuria.     Physical Exam   Constitutional: She appears well-developed and well-nourished. No distress.   HENT: atraumatic, EOMI  Head: Normocephalic.   Neck: Normal range of motion. Neck supple.   Pulmonary: Effort normal.  Breasts: bilateral without masses, skin changes or nipple discharge. Bilaterally soft and warm to touch. No areas of erythema or pain.  Abdominal: Soft.   Pelvic exam was performed with patient supine. No labial fusion. There is no rash, tenderness, lesion or injury on the right labia. There is no rash, tenderness, lesion or injury on the left labia. Urethral meatus does not show any tenderness, inflammation or discharge. Palpation of midline bladder without pain or discomfort. Uterus is not deviated, not enlarged, not fixed and not tender. Cervix exhibits no motion tenderness, no discharge and no friability. Right adnexum displays no mass, no tenderness and no fullness. Left adnexum displays no mass, no tenderness and no fullness. No erythema or tenderness in the vagina. No foreign body in the vagina. No signs of injury around the vagina or anus. Perineum without lesions, signs of injury, erythema or swelling. No vaginal discharge found.   Lymphadenopathy:        Right: No inguinal adenopathy present.        Left: No inguinal adenopathy present.

## 2024-11-20 ENCOUNTER — TELEPHONE (OUTPATIENT)
Age: 54
End: 2024-11-20

## 2024-12-04 ENCOUNTER — HOSPITAL ENCOUNTER (OUTPATIENT)
Dept: RADIOLOGY | Facility: HOSPITAL | Age: 54
Discharge: HOME | End: 2024-12-04
Payer: COMMERCIAL

## 2024-12-04 DIAGNOSIS — J06.9 UPPER RESPIRATORY TRACT INFECTION, UNSPECIFIED TYPE: ICD-10-CM

## 2024-12-04 DIAGNOSIS — J06.9 UPPER RESPIRATORY TRACT INFECTION, UNSPECIFIED TYPE: Primary | ICD-10-CM

## 2024-12-04 PROCEDURE — 71046 X-RAY EXAM CHEST 2 VIEWS: CPT

## 2024-12-04 RX ORDER — GUAIFENESIN, PSEUDOEPHEDRINE HYDROCHLORIDE 600; 60 MG/1; MG/1
1 TABLET, EXTENDED RELEASE ORAL EVERY 12 HOURS
Qty: 60 TABLET | Refills: 0 | Status: SHIPPED | OUTPATIENT
Start: 2024-12-04

## 2024-12-04 RX ORDER — AZITHROMYCIN 250 MG/1
TABLET, FILM COATED ORAL
Qty: 6 TABLET | Refills: 0 | Status: SHIPPED | OUTPATIENT
Start: 2024-12-04 | End: 2024-12-09

## 2024-12-04 RX ORDER — FLUTICASONE PROPIONATE 50 MCG
1 SPRAY, SUSPENSION (ML) NASAL DAILY
Qty: 15.8 ML | Refills: 3 | Status: SHIPPED | OUTPATIENT
Start: 2024-12-04

## 2024-12-10 ENCOUNTER — OFFICE VISIT (OUTPATIENT)
Dept: FAMILY MEDICINE CLINIC | Facility: CLINIC | Age: 54
End: 2024-12-10
Payer: COMMERCIAL

## 2024-12-10 ENCOUNTER — TELEPHONE (OUTPATIENT)
Age: 54
End: 2024-12-10

## 2024-12-10 VITALS
WEIGHT: 166 LBS | OXYGEN SATURATION: 98 % | TEMPERATURE: 98.2 F | HEART RATE: 88 BPM | SYSTOLIC BLOOD PRESSURE: 126 MMHG | RESPIRATION RATE: 18 BRPM | BODY MASS INDEX: 30.55 KG/M2 | HEIGHT: 62 IN | DIASTOLIC BLOOD PRESSURE: 80 MMHG

## 2024-12-10 DIAGNOSIS — Z00.00 ANNUAL PHYSICAL EXAM: Primary | ICD-10-CM

## 2024-12-10 DIAGNOSIS — E66.09 CLASS 1 OBESITY DUE TO EXCESS CALORIES WITHOUT SERIOUS COMORBIDITY WITH BODY MASS INDEX (BMI) OF 30.0 TO 30.9 IN ADULT: ICD-10-CM

## 2024-12-10 DIAGNOSIS — E66.811 CLASS 1 OBESITY DUE TO EXCESS CALORIES WITHOUT SERIOUS COMORBIDITY WITH BODY MASS INDEX (BMI) OF 30.0 TO 30.9 IN ADULT: ICD-10-CM

## 2024-12-10 DIAGNOSIS — R73.01 IFG (IMPAIRED FASTING GLUCOSE): ICD-10-CM

## 2024-12-10 DIAGNOSIS — Z00.00 HEALTHCARE MAINTENANCE: ICD-10-CM

## 2024-12-10 DIAGNOSIS — R05.1 ACUTE COUGH: ICD-10-CM

## 2024-12-10 DIAGNOSIS — E55.9 VITAMIN D DEFICIENCY: ICD-10-CM

## 2024-12-10 PROBLEM — I47.9 TACHYCARDIA, PAROXYSMAL (HCC): Status: RESOLVED | Noted: 2023-12-21 | Resolved: 2024-12-10

## 2024-12-10 PROCEDURE — 99396 PREV VISIT EST AGE 40-64: CPT | Performed by: NURSE PRACTITIONER

## 2024-12-10 PROCEDURE — 99214 OFFICE O/P EST MOD 30 MIN: CPT | Performed by: NURSE PRACTITIONER

## 2024-12-10 RX ORDER — PREDNISONE 20 MG/1
20 TABLET ORAL DAILY
Qty: 5 TABLET | Refills: 0 | Status: SHIPPED | OUTPATIENT
Start: 2024-12-10 | End: 2024-12-15

## 2024-12-10 RX ORDER — TIRZEPATIDE 2.5 MG/.5ML
2.5 INJECTION, SOLUTION SUBCUTANEOUS WEEKLY
Qty: 2 ML | Refills: 0 | Status: SHIPPED | OUTPATIENT
Start: 2024-12-10 | End: 2025-01-07

## 2024-12-10 RX ORDER — BENZONATATE 100 MG/1
100 CAPSULE ORAL 3 TIMES DAILY PRN
Qty: 20 CAPSULE | Refills: 0 | Status: SHIPPED | OUTPATIENT
Start: 2024-12-10

## 2024-12-10 NOTE — TELEPHONE ENCOUNTER
PA for Zepbound 2.5 mg/0.5 mL SUBMITTED to     via    [x][facility]-KEY: [id number]  [][employer]-Case ID #   [][employer]-Auth ID # NDC #   []Faxed to plan   []Other website   []Phone call Case ID #     [x]PA sent as URGENT    All office notes, labs and other pertaining documents and studies sent. Clinical questions answered. Awaiting determination from insurance company.     Turnaround time for your insurance to make a decision on your Prior Authorization can take 7-21 business days.

## 2024-12-10 NOTE — PROGRESS NOTES
Adult Annual Physical  Name: Jessica Kelley      : 1970      MRN: [id number]  Encounter Provider: ANSLEY Spangler  Encounter Date: 12/10/2024   Encounter department: [facility] [address]    Assessment & Plan  Acute cough  Patient does have slight wheeze on exam.  Will give Tessalon Perles for cough and prednisone for wheeze.  Advised to call if not improving.  Orders:    benzonatate (TESSALON PERLES) 100 mg capsule; Take 1 capsule (100 mg total) by mouth 3 (three) times a day as needed for cough    predniSONE 20 mg tablet; Take 1 tablet (20 mg total) by mouth daily for 5 days    IFG (impaired fasting glucose)    Orders:    Hemoglobin A1C; Future    Vitamin D deficiency    Orders:    Vitamin D 25 hydroxy; Future    Healthcare maintenance    Orders:    CBC and differential; Future    Comprehensive metabolic panel; Future    Lipid panel; Future    TSH, 3rd generation with Free T4 reflex; Future    Class 1 obesity due to excess calories without serious comorbidity with body mass index (BMI) of 30.0 to 30.9 in adult  Prior Authorization Clinical Questions for Weight Management Pharmacotherapy              Orders:    tirzepatide (Zepbound) 2.5 mg/0.5 mL auto-injector; Inject 0.5 mL (2.5 mg total) under the skin once a week for 28 days  Patient was previously on semaglutide and did well on this.  Patient would like to initiate Zepbound.  No contraindications noted.  Patient will call each month if she wants to increase her dose.  Advise healthy diet and exercise.  Patient has been on regimen of healthy diet and exercise for the past 6 months.  Annual physical exam         Immunizations and preventive care screenings were discussed with patient today. Appropriate education was printed on patient's after visit summary.    Counseling:  Exercise: the importance of regular exercise/physical activity was discussed. Recommend exercise 3-5 times per week for at least 30  minutes.          History of Present Illness     Adult Annual Physical:  Patient presents for annual physical. Patient presents for annual physical.  She did have upper respiratory infection last week and is improving.  She did finish out her Z-Elvis.  She continues with lingering cough..     Diet and Physical Activity:  - Diet/Nutrition: well balanced diet.  - Exercise: 3-4 times a week on average.    Depression Screening:  - PHQ-2 Score: 0    General Health:  - Sleep: sleeps well.  - Hearing: normal hearing bilateral ears.  - Vision: wears glasses.  - Dental: regular dental visits.    /GYN Health:  - Follows with GYN: yes.   - Menopause: postmenopausal.     Review of Systems   Constitutional:  Negative for chills, diaphoresis and fever.   HENT:  Negative for ear pain and sore throat.    Eyes:  Negative for pain and visual disturbance.   Respiratory:  Positive for cough. Negative for shortness of breath.    Cardiovascular:  Negative for chest pain and palpitations.   Gastrointestinal:  Negative for abdominal pain and vomiting.   Genitourinary:  Negative for dysuria and hematuria.   Musculoskeletal:  Negative for arthralgias and back pain.   Skin:  Negative for color change and rash.   Neurological:  Negative for dizziness, seizures, syncope, light-headedness and headaches.   Psychiatric/Behavioral:  Negative for dysphoric mood and sleep disturbance. The patient is not nervous/anxious.    All other systems reviewed and are negative.    Current Outpatient Medications on File Prior to Visit   Medication Sig Dispense Refill    ALPRAZolam (XANAX) 0.5 mg tablet Take 0.5 mg by mouth 2 (two) times a day as needed  0    flunisolide (NASALIDE) 25 MCG/ACT (0.025%) SOLN flunisolide 25 mcg (0.025 %) nasal spray      fluticasone (FLONASE) 50 mcg/act nasal spray 1 spray into each nostril daily 15.8 mL 3    Multiple Vitamins-Minerals (MULTIVITAMIN ADULT PO) 1 DAILY      pseudoephedrine-guaifenesin (MUCINEX D)  MG per tablet  "Take 1 tablet by mouth every 12 (twelve) hours 60 tablet 0    [] azithromycin (Zithromax) 250 mg tablet Take 2 tablets (500 mg total) by mouth daily for 1 day, THEN 1 tablet (250 mg total) daily for 4 days. 6 tablet 0    EPINEPHrine (EPIPEN) 0.3 mg/0.3 mL SOAJ Inject 0.3 mL (0.3 mg total) into a muscle once for 1 dose 0.6 mL 0     No current facility-administered medications on file prior to visit.        Objective   /80 (BP Location: Left arm, Patient Position: Sitting)   Pulse 88   Temp 98.2 °F (36.8 °C)   Resp 18   Ht 5' 2\" (1.575 m)   Wt 75.3 kg (166 lb)   SpO2 98%   BMI 30.36 kg/m²     Physical Exam  Vitals and nursing note reviewed.   Constitutional:       General: She is not in acute distress.     Appearance: She is well-developed.   HENT:      Head: Normocephalic and atraumatic.      Right Ear: Tympanic membrane normal.      Left Ear: Tympanic membrane normal.      Nose: No congestion.      Mouth/Throat:      Pharynx: No oropharyngeal exudate.   Eyes:      Conjunctiva/sclera: Conjunctivae normal.   Cardiovascular:      Rate and Rhythm: Normal rate and regular rhythm.      Heart sounds: No murmur heard.  Pulmonary:      Effort: Pulmonary effort is normal. No respiratory distress.      Breath sounds: Wheezing present.   Abdominal:      Palpations: Abdomen is soft.      Tenderness: There is no abdominal tenderness.   Musculoskeletal:         General: No swelling.      Cervical back: Neck supple.   Skin:     General: Skin is warm and dry.      Capillary Refill: Capillary refill takes less than 2 seconds.   Neurological:      Mental Status: She is alert and oriented to person, place, and time.   Psychiatric:         Mood and Affect: Mood normal.       Administrative Statements   I have spent a total time of 30 minutes in caring for this patient on the day of the visit/encounter including Counseling / Coordination of care, Documenting in the medical record, Reviewing / ordering tests, medicine, " procedures  , and Obtaining or reviewing history  . Topics discussed with the patient / family include symptom assessment and management and medication review.

## 2024-12-11 ENCOUNTER — RESULTS FOLLOW-UP (OUTPATIENT)
Dept: FAMILY MEDICINE CLINIC | Facility: CLINIC | Age: 54
End: 2024-12-11

## 2024-12-11 ENCOUNTER — TELEPHONE (OUTPATIENT)
Dept: ADMINISTRATIVE | Facility: OTHER | Age: 54
End: 2024-12-11

## 2024-12-11 NOTE — TELEPHONE ENCOUNTER
----- Message from Katt WESTFALL sent at 12/10/2024  9:11 AM EST -----  Regarding: HM on Pap  12/10/24 9:11 AM    Hello, our patient Jessica Kelley has had Pap Smear (HPV) aka Cervical Cancer Screening completed/performed. Please assist in updating the patient chart by pulling the document from labs Tab within Chart Review. The date of service is 09/05/2023.     Thank you,  Kadedra Jackson-Reyes, MA [facility] [address] [place] [address]

## 2024-12-11 NOTE — TELEPHONE ENCOUNTER
PA for Zepbound 2.5 mg/0.5 mL  DENIED    Reason:(Screenshot if applicable)          Message sent to office clinical pool Yes    Denial letter scanned into Media Yes    Appeal started No (Provider will need to decide if appeal is warranted and send clinical documentation to Prior Authorization Team for initiation.)    **Please follow up with your patient regarding denial and next steps**

## 2024-12-11 NOTE — TELEPHONE ENCOUNTER
Upon review of the In Basket request we were able to locate, review, and update the patient chart as requested for Pap Smear (HPV) aka Cervical Cancer Screening.    Any additional questions or concerns should be emailed to the Practice Liaisons via the appropriate education email address, please do not reply via In Basket.    Thank you  Brigitte Cotton MA   [facility]

## 2025-01-10 ENCOUNTER — APPOINTMENT (OUTPATIENT)
Dept: RADIOLOGY | Facility: CLINIC | Age: 55
End: 2025-01-10
Payer: COMMERCIAL

## 2025-01-10 ENCOUNTER — OFFICE VISIT (OUTPATIENT)
Dept: OBGYN CLINIC | Facility: CLINIC | Age: 55
End: 2025-01-10
Payer: COMMERCIAL

## 2025-01-10 VITALS — BODY MASS INDEX: 29.66 KG/M2 | HEIGHT: 62 IN | WEIGHT: 161.2 LBS

## 2025-01-10 DIAGNOSIS — M18.12 PRIMARY OSTEOARTHRITIS OF FIRST CARPOMETACARPAL JOINT OF LEFT HAND: ICD-10-CM

## 2025-01-10 DIAGNOSIS — M54.2 NECK PAIN: ICD-10-CM

## 2025-01-10 DIAGNOSIS — M62.830 SPASM OF RIGHT TRAPEZIUS MUSCLE: Primary | ICD-10-CM

## 2025-01-10 DIAGNOSIS — M54.12 CERVICAL RADICULOPATHY: ICD-10-CM

## 2025-01-10 PROCEDURE — 72040 X-RAY EXAM NECK SPINE 2-3 VW: CPT

## 2025-01-10 PROCEDURE — 99214 OFFICE O/P EST MOD 30 MIN: CPT | Performed by: ORTHOPAEDIC SURGERY

## 2025-01-10 RX ORDER — CYCLOBENZAPRINE HCL 5 MG
5 TABLET ORAL 3 TIMES DAILY PRN
Qty: 10 TABLET | Refills: 0 | Status: SHIPPED | OUTPATIENT
Start: 2025-01-10

## 2025-01-10 NOTE — PROGRESS NOTES
Patient Name:  Jessica Kelley  MRN:  [id number]    Assessment & Plan     1. Spasm of right trapezius muscle  -     cyclobenzaprine (FLEXERIL) 5 mg tablet; Take 1 tablet (5 mg total) by mouth 3 (three) times a day as needed for muscle spasms  2. Neck pain  -     XR spine cervical 2 or 3 vw injury; Future; Expected date: 01/10/2025  -     Ambulatory Referral to Physical Therapy; Future  -     cyclobenzaprine (FLEXERIL) 5 mg tablet; Take 1 tablet (5 mg total) by mouth 3 (three) times a day as needed for muscle spasms  3. Cervical radiculopathy  -     Ambulatory Referral to Physical Therapy; Future  -     Ambulatory Referral to Physical Therapy; Future  -     cyclobenzaprine (FLEXERIL) 5 mg tablet; Take 1 tablet (5 mg total) by mouth 3 (three) times a day as needed for muscle spasms  4. Primary osteoarthritis of first carpometacarpal joint of left hand  -     Durable Medical Equipment  -     Ambulatory Referral to Orthopedic Surgery; Future    X-rays were reviewed in office today with patient  Non operative treatments were discussed in office today consisting of formal physical therapy, medication of otc nsaids;steroids;muscle relaxers, and cortisone injections through pain management.   Patient will start formal physical therapy to work with postural exercises, gentle range of motion exercises,  grastin and cervical traction along with periscapular strengthening exercises.    Patient was provided a script in office for for Flexeril for spasm. Potential side effects discussed in detail.   In regards of her left wrist CMC joint OA she was provided with a comfort cool brace in office today and referral to see Dr. Khan for possible repeat cortisone injection as she responded well in the past to the CSI.   Follow up on an as needed basis if she continues with cervical discomfort will consider referral to spine and pain management vs further imaging.     Chief Complaint     Right shoulder pain    History of the  Present Illness     Jessica Kelley is a RHD 54 y.o. female with Right shoulder/cervical pain that started about 3 weeks ago, denies specific mechanism of injury. She locates most of her pain to be right sided trapezius that radiates into her neck and in-between her shoulder blades if she is sitting straight or standing still the pressure will increase. She will wake up in the middle of the night with numbness and tingling to her right thumb index and middle finger. She's treated with chiropractic care twice and deep tissue massage with cupping that seemed to provide relief but still feels her discomfort is present.  Patient recently completed a steroid taper for walking pneumonia one month ago, states she is allergic to benadryl.  She has a history of adhesive capsulitis to the right shoulder 4 years ago, treated with PT and cortisone injection that provided her with significant relief until a few weeks ago. In addition to cervical discomfort patient has been experiencing left thumb pain, she has known left CMC joint OA that has been treated in the past with a CSI by Dr. Boone on 6/22/22 and feels this provided her with significant relief. Recently she has been experiencing pain with gripping onto objects, denies dropping anything because of weakness.   Patient works for a [employer] company and feels.     Review of Systems     Review of Systems   Constitutional:  Negative for chills and fever.   HENT:  Negative for ear pain and sore throat.    Eyes:  Negative for pain and visual disturbance.   Respiratory:  Negative for cough and shortness of breath.    Cardiovascular:  Negative for chest pain and palpitations.   Gastrointestinal:  Negative for abdominal pain and vomiting.   Genitourinary:  Negative for dysuria and hematuria.   Musculoskeletal:  Negative for arthralgias and back pain.   Skin:  Negative for color change and rash.   Neurological:  Negative for seizures and syncope.   All other systems reviewed  "and are negative.      Physical Exam     Ht 5' 2\" (1.575 m)   Wt 73.1 kg (161 lb 3.2 oz)   BMI 29.48 kg/m²       Cervical Spine:   Active range of motion mild restriction to extension.  There is rhomboid and trapezius midline tenderness.    Sensation  to light touch C5 through T1 dermatomes left upper extremity.   Sensation  to light touch C5 through T1 dermatomes right upper extremity.    Left Motor: 5/5 biceps, 5/5 triceps, 5/5 wrist extension, 5/5 finger flexion, 5/5 finger abduction   Right Motor: 5/5 biceps, 5/5 triceps, 5/5 wrist extension, 5/5 finger flexion, 5/5 finger abduction   Spurling test is  negative  Lynch's sign negative     Left Wrist Exam  Full Range of Motion  Tenderness to CMC joint   Positive CMC grind  Negative Finkelstein  Neurovascularly intact       Eyes:  Anicteric sclerae.  Neck:  Supple.  Lungs:  Normal respiratory effort.  Cardiovascular:  Capillary refill is less than 2 seconds.  Skin:  Intact without erythema.  Neurologic:  Sensation grossly intact to light touch.  Psychiatric:  Mood and affect are appropriate.    Data Review     I have personally reviewed pertinent films in PACS, and my interpretation follows:    X-rays taken 1/10/2025 of Cervical spine demonstrating cervical kyphosis with multilevel degenerative changes greatest at C5-C6, C6-C7 with disc space narrowing and osteophyte formation.     Past Medical History:   Diagnosis Date    Disease of thyroid gland        Past Surgical History:   Procedure Laterality Date     SECTION      FOOT SURGERY      HERNIA REPAIR      LIPOSUCTION      MYOMECTOMY      Small one noticed when going through IVF- it was removed       Allergies   Allergen Reactions    Diphenhydramine Tachycardia     Other reaction(s): Unknown  Other reaction(s): panic attack, swelling, hives      Amoxicillin Hives and Rash    Chicken Allergy - Food Allergy Swelling, Sneezing, Fatigue, Throat Swelling, Eye Swelling, Tongue Swelling and Facial " Swelling     Skin prick positive 4/29/24.  Has EOE and can cause swallowing problems    Other Other (See Comments)     10/29/24Skin prick positive to turkey.  Has EOE and can cause swallowing difficulty       Current Outpatient Medications on File Prior to Visit   Medication Sig Dispense Refill    ALPRAZolam (XANAX) 0.5 mg tablet Take 0.5 mg by mouth 2 (two) times a day as needed  0    EPINEPHrine (EPIPEN) 0.3 mg/0.3 mL SOAJ Inject 0.3 mL (0.3 mg total) into a muscle once for 1 dose 0.6 mL 0    flunisolide (NASALIDE) 25 MCG/ACT (0.025%) SOLN flunisolide 25 mcg (0.025 %) nasal spray      fluticasone (FLONASE) 50 mcg/act nasal spray 1 spray into each nostril daily 15.8 mL 3    Multiple Vitamins-Minerals (MULTIVITAMIN ADULT PO) 1 DAILY      pseudoephedrine-guaifenesin (MUCINEX D)  MG per tablet Take 1 tablet by mouth every 12 (twelve) hours 60 tablet 0    [DISCONTINUED] benzonatate (TESSALON PERLES) 100 mg capsule Take 1 capsule (100 mg total) by mouth 3 (three) times a day as needed for cough 20 capsule 0     No current facility-administered medications on file prior to visit.       Social History     Tobacco Use    Smoking status: Former     Current packs/day: 0.00     Average packs/day: 1 pack/day for 15.0 years (15.0 ttl pk-yrs)     Types: Cigarettes     Quit date: 2/20/2009     Years since quitting: 15.8     Passive exposure: Never    Smokeless tobacco: Never   Vaping Use    Vaping status: Never Used   Substance Use Topics    Alcohol use: Yes     Comment: Socially    Drug use: Never       Family History   Problem Relation Age of Onset    COPD Mother     Diabetes Mother         Type 2    Heart disease Mother         Smoker Coronary Artery Disease    Seizures Mother         May have been due to medication    Thyroid disease Paternal Aunt     Diabetes Maternal Grandmother         Type 1    Heart disease Brother         Coronary Artery Disease             Procedures Performed     Procedures  None performed today      Scribe Attestation      I,:  Rosa M Padilla am acting as a scribe while in the presence of the attending physician.:       I,:  Darion Rodriguez, DO personally performed the services described in this documentation    as scribed in my presence.:

## 2025-01-29 ENCOUNTER — OFFICE VISIT (OUTPATIENT)
Dept: OBGYN CLINIC | Facility: CLINIC | Age: 55
End: 2025-01-29
Payer: COMMERCIAL

## 2025-01-29 VITALS — BODY MASS INDEX: 29.44 KG/M2 | WEIGHT: 160 LBS | HEIGHT: 62 IN

## 2025-01-29 DIAGNOSIS — M18.12 PRIMARY OSTEOARTHRITIS OF FIRST CARPOMETACARPAL JOINT OF LEFT HAND: Primary | ICD-10-CM

## 2025-01-29 PROCEDURE — 20600 DRAIN/INJ JOINT/BURSA W/O US: CPT | Performed by: SURGERY

## 2025-01-29 PROCEDURE — 99213 OFFICE O/P EST LOW 20 MIN: CPT | Performed by: SURGERY

## 2025-01-29 RX ORDER — TRIAMCINOLONE ACETONIDE 40 MG/ML
20 INJECTION, SUSPENSION INTRA-ARTICULAR; INTRAMUSCULAR
Status: COMPLETED | OUTPATIENT
Start: 2025-01-29 | End: 2025-01-29

## 2025-01-29 RX ORDER — BUPIVACAINE HYDROCHLORIDE 2.5 MG/ML
0.5 INJECTION, SOLUTION INFILTRATION; PERINEURAL
Status: COMPLETED | OUTPATIENT
Start: 2025-01-29 | End: 2025-01-29

## 2025-01-29 RX ADMIN — TRIAMCINOLONE ACETONIDE 20 MG: 40 INJECTION, SUSPENSION INTRA-ARTICULAR; INTRAMUSCULAR at 09:00

## 2025-01-29 RX ADMIN — BUPIVACAINE HYDROCHLORIDE 0.5 ML: 2.5 INJECTION, SOLUTION INFILTRATION; PERINEURAL at 09:00

## 2025-01-29 NOTE — PROGRESS NOTES
ASSESSMENT/PLAN:      Assessment & Plan  Primary osteoarthritis of first carpometacarpal joint of left hand  Previous xrays reviewed demonstrating CMC arthritis   Patient's last injection was done in June 2022. She has had good relief since then but pain is returning  Decision was made to proceed with repeat left thumb CMC injection today, this was tolerated well   Continue adjunctive measures as well including bracing, anti-inflammatories, heat, etc  Injection may be repeated as often as every 3 months if needed     PLAN: repeat left first CMC injection done today    Orders:    Ambulatory Referral to Orthopedic Surgery    Small joint arthrocentesis         The patient verbalized understanding of exam findings and treatment plan. We engaged in the shared decision-making process and treatment options were discussed at length with the patient. Surgical and conservative management discussed today along with risks and benefits.    Follow Up:  Return if symptoms worsen or fail to improve.      To Do Next Visit:  Re-evaluation of current issue    ____________________________________________________________________________________________________________________________________________      CHIEF COMPLAINT:  Chief Complaint   Patient presents with    Follow-up     Patient would like another injection today for her left thumb her last injection was on 6/22/22       SUBJECTIVE:  Jessica Kelley is a 54 y.o. year old RHD female who presents for follow up evaluation of left first CMC arthritis.  Patient underwent an injection about 2.5 years ago which provided good relief. She notes that since then she discovered she was allergic to poultry and after avoiding this she was able to reduce her body's general inflammation level which also helped her thumb pain. Pain just began again a few weeks ago.        I have personally reviewed all the relevant PMH, PSH, SH, FH, Medications and allergies.     PAST MEDICAL HISTORY:  Past  Medical History:   Diagnosis Date    Disease of thyroid gland        PAST SURGICAL HISTORY:  Past Surgical History:   Procedure Laterality Date     SECTION      FOOT SURGERY      HERNIA REPAIR      LIPOSUCTION      MYOMECTOMY      Small one noticed when going through IVF- it was removed       FAMILY HISTORY:  Family History   Problem Relation Age of Onset    COPD Mother     Diabetes Mother         Type 2    Heart disease Mother         Smoker Coronary Artery Disease    Seizures Mother         May have been due to medication    Thyroid disease Paternal Aunt     Diabetes Maternal Grandmother         Type 1    Heart disease Brother         Coronary Artery Disease       SOCIAL HISTORY:  Social History     Tobacco Use    Smoking status: Former     Current packs/day: 0.00     Average packs/day: 1 pack/day for 15.0 years (15.0 ttl pk-yrs)     Types: Cigarettes     Quit date: 2009     Years since quitting: 15.9     Passive exposure: Never    Smokeless tobacco: Never   Vaping Use    Vaping status: Never Used   Substance Use Topics    Alcohol use: Yes     Comment: Socially    Drug use: Never       MEDICATIONS:    Current Outpatient Medications:     ALPRAZolam (XANAX) 0.5 mg tablet, Take 0.5 mg by mouth 2 (two) times a day as needed, Disp: , Rfl: 0    cyclobenzaprine (FLEXERIL) 5 mg tablet, Take 1 tablet (5 mg total) by mouth 3 (three) times a day as needed for muscle spasms, Disp: 10 tablet, Rfl: 0    flunisolide (NASALIDE) 25 MCG/ACT (0.025%) SOLN, flunisolide 25 mcg (0.025 %) nasal spray, Disp: , Rfl:     fluticasone (FLONASE) 50 mcg/act nasal spray, 1 spray into each nostril daily, Disp: 15.8 mL, Rfl: 3    Multiple Vitamins-Minerals (MULTIVITAMIN ADULT PO), 1 DAILY, Disp: , Rfl:     pseudoephedrine-guaifenesin (MUCINEX D)  MG per tablet, Take 1 tablet by mouth every 12 (twelve) hours, Disp: 60 tablet, Rfl: 0    EPINEPHrine (EPIPEN) 0.3 mg/0.3 mL SOAJ, Inject 0.3 mL (0.3 mg total) into a muscle once  for 1 dose, Disp: 0.6 mL, Rfl: 0    ALLERGIES:  Allergies   Allergen Reactions    Diphenhydramine Tachycardia     Other reaction(s): Unknown  Other reaction(s): panic attack, swelling, hives      Amoxicillin Hives and Rash    Chicken Allergy - Food Allergy Swelling, Sneezing, Fatigue, Throat Swelling, Eye Swelling, Tongue Swelling and Facial Swelling     Skin prick positive 4/29/24.  Has EOE and can cause swallowing problems    Other Other (See Comments)     10/29/24Skin prick positive to turkey.  Has EOE and can cause swallowing difficulty       REVIEW OF SYSTEMS:  Review of Systems   Constitutional:  Negative for chills and fever.   HENT:  Negative for ear pain and sore throat.    Eyes:  Negative for pain and visual disturbance.   Respiratory:  Negative for cough and shortness of breath.    Cardiovascular:  Negative for chest pain and palpitations.   Gastrointestinal:  Negative for abdominal pain and vomiting.   Genitourinary:  Negative for dysuria and hematuria.   Musculoskeletal:  Positive for arthralgias. Negative for back pain.   Skin:  Negative for color change and rash.   Neurological:  Negative for seizures and syncope.   All other systems reviewed and are negative.      VITALS:  There were no vitals filed for this visit.    LABS:  HgA1c:   Lab Results   Component Value Date    HGBA1C 5.7 (H) 05/25/2023     BMP:   Lab Results   Component Value Date    CALCIUM 9.3 02/01/2024    K 4.4 02/01/2024    CO2 26 02/01/2024     02/01/2024    BUN 12 02/01/2024    CREATININE 0.91 02/01/2024       _____________________________________________________  PHYSICAL EXAMINATION:  General: well developed and well nourished, alert, oriented times 3, and appears comfortable  Psychiatric: Normal  HEENT: Normocephalic, Atraumatic Trachea Midline, No torticollis  Pulmonary: No audible wheezing or respiratory distress   Cardiovascular: No pitting edema, 2+ radial pulse   Skin: No masses, erythema, lacerations, fluctation,  "ulcerations  Neurovascular: Sensation Intact to the Median, Ulnar, Radial Nerve, Motor Intact to the Median, Ulnar, Radial Nerve, and Pulses Intact  Musculoskeletal: Normal, except as noted in detailed exam and in HPI.      MUSCULOSKELETAL EXAMINATION:  left CMC Exam:  No adduction contracture  No hyperextension deformity of MCP joint  + Shoulder deformity   Positive localized tenderness over radial and dorsal aspect of thumb (CMC joint)  Grind test is Positive for pain and Negative for crepitus  Metacarpal load shift test positive  No triggering or tenderness over the A1 pulley  No pain with Finkelstein’s maneuver       ___________________________________________________  STUDIES REVIEWED:  I have personally reviewed AP lateral and oblique radiographs of the left thumb from 2020 which demonstrate degenerative changes at the first CMC joint            PROCEDURES PERFORMED:  Small joint arthrocentesis: L thumb CMC  [facility] Protocol:  procedure performed by consultantConsent: Verbal consent obtained.  Risks and benefits: risks, benefits and alternatives were discussed  Consent given by: patient  Time out: Immediately prior to procedure a \"time out\" was called to verify the correct patient, procedure, equipment, support staff and site/side marked as required.  Site marked: the operative site was marked  Required items: required blood products, implants, devices, and special equipment available  Patient identity confirmed: verbally with patient  Supporting Documentation  Indications: pain   Procedure Details  Location: thumb - L thumb CMC  Needle size: 25 G  Ultrasound guidance: no  Approach: dorsal  Medications administered: 20 mg triamcinolone acetonide 40 mg/mL; 0.5 mL bupivacaine 0.25 %    Patient tolerance: patient tolerated the procedure well with no immediate complications  Dressing:  Sterile dressing applied             _____________________________________________________        "

## 2025-01-29 NOTE — ASSESSMENT & PLAN NOTE
Previous xrays reviewed demonstrating CMC arthritis   Patient's last injection was done in June 2022. She has had good relief since then but pain is returning  Decision was made to proceed with repeat left thumb CMC injection today, this was tolerated well   Continue adjunctive measures as well including bracing, anti-inflammatories, heat, etc  Injection may be repeated as often as every 3 months if needed     PLAN: repeat left first CMC injection done today    Orders:    Ambulatory Referral to Orthopedic Surgery    Small joint arthrocentesis

## 2025-02-10 ENCOUNTER — OFFICE VISIT (OUTPATIENT)
Dept: FAMILY MEDICINE CLINIC | Facility: CLINIC | Age: 55
End: 2025-02-10
Payer: COMMERCIAL

## 2025-02-10 VITALS
TEMPERATURE: 98.3 F | SYSTOLIC BLOOD PRESSURE: 112 MMHG | WEIGHT: 160 LBS | OXYGEN SATURATION: 99 % | DIASTOLIC BLOOD PRESSURE: 80 MMHG | HEIGHT: 62 IN | HEART RATE: 92 BPM | BODY MASS INDEX: 29.44 KG/M2

## 2025-02-10 DIAGNOSIS — R50.9 FEVER, UNSPECIFIED FEVER CAUSE: Primary | ICD-10-CM

## 2025-02-10 PROCEDURE — 99213 OFFICE O/P EST LOW 20 MIN: CPT | Performed by: NURSE PRACTITIONER

## 2025-02-10 PROCEDURE — 87636 SARSCOV2 & INF A&B AMP PRB: CPT | Performed by: NURSE PRACTITIONER

## 2025-02-10 NOTE — PROGRESS NOTES
"Name: Jessica Kelley      : 1970      MRN: [id number]  Encounter Provider: ANSLEY Spangler  Encounter Date: 2/10/2025   Encounter department: [facility] [address]  :  Assessment & Plan  Fever, unspecified fever cause  Discussed symptomatic care.  Send out COVID/flu swab.  Assume that this is flu, since her son also has the flu.  Advised Mucinex, Tylenol.  Defer Tamiflu as symptoms are mild..  Orders:    Covid/Flu- Lab Collect           History of Present Illness   Patient presents for concerns of fever.  Her son has the flu.  This started about 3 days ago.  She has been using Mucinex, Tylenol and vitamin C.    Cough  This is a new problem. The current episode started yesterday. The problem has been gradually worsening. The cough is Non-productive. Associated symptoms include chills, ear congestion, ear pain, a fever and headaches. Pertinent negatives include no chest pain, heartburn, hemoptysis, myalgias, nasal congestion, postnasal drip, rash, rhinorrhea, sore throat, shortness of breath, sweats, weight loss or wheezing.     Review of Systems   Constitutional:  Positive for chills, fatigue and fever. Negative for weight loss.   HENT:  Positive for ear pain. Negative for congestion, postnasal drip, rhinorrhea and sore throat.    Respiratory:  Positive for cough and chest tightness. Negative for hemoptysis, shortness of breath and wheezing.    Cardiovascular:  Negative for chest pain.   Gastrointestinal:  Positive for diarrhea and nausea. Negative for heartburn and vomiting.   Musculoskeletal:  Negative for myalgias.   Skin:  Negative for rash.   Neurological:  Positive for headaches.       Objective   /80 (BP Location: Left arm, Patient Position: Sitting, Cuff Size: Standard)   Pulse 92   Temp 98.3 °F (36.8 °C)   Ht 5' 2\" (1.575 m)   Wt 72.6 kg (160 lb)   SpO2 99%   BMI 29.26 kg/m²      Physical Exam  Vitals and nursing note reviewed. "   Constitutional:       General: She is not in acute distress.     Appearance: She is well-developed.   HENT:      Head: Normocephalic and atraumatic.      Right Ear: Tympanic membrane normal.      Left Ear: Tympanic membrane normal.      Nose: No congestion.      Mouth/Throat:      Pharynx: No oropharyngeal exudate.   Eyes:      Conjunctiva/sclera: Conjunctivae normal.   Cardiovascular:      Rate and Rhythm: Normal rate and regular rhythm.      Heart sounds: No murmur heard.  Pulmonary:      Effort: Pulmonary effort is normal. No respiratory distress.      Breath sounds: Normal breath sounds.   Abdominal:      Palpations: Abdomen is soft.      Tenderness: There is no abdominal tenderness.   Musculoskeletal:         General: No swelling.      Cervical back: Neck supple.   Skin:     General: Skin is warm and dry.      Capillary Refill: Capillary refill takes less than 2 seconds.   Neurological:      Mental Status: She is alert and oriented to person, place, and time.   Psychiatric:         Mood and Affect: Mood normal.       Administrative Statements   I have spent a total time of 15 minutes in caring for this patient on the day of the visit/encounter including Documenting in the medical record, Reviewing / ordering tests, medicine, procedures  , and Obtaining or reviewing history  . Topics discussed with the patient / family include symptom assessment and management and medication review.

## 2025-02-11 LAB
FLUAV RNA RESP QL NAA+PROBE: NEGATIVE
FLUBV RNA RESP QL NAA+PROBE: NEGATIVE
SARS-COV-2 RNA RESP QL NAA+PROBE: NEGATIVE

## 2025-02-27 ENCOUNTER — APPOINTMENT (OUTPATIENT)
Age: 55
End: 2025-02-27
Payer: COMMERCIAL

## 2025-02-27 DIAGNOSIS — E55.9 VITAMIN D DEFICIENCY: ICD-10-CM

## 2025-02-27 DIAGNOSIS — Z00.00 HEALTHCARE MAINTENANCE: ICD-10-CM

## 2025-02-27 DIAGNOSIS — R73.01 IFG (IMPAIRED FASTING GLUCOSE): ICD-10-CM

## 2025-02-27 LAB
25(OH)D3 SERPL-MCNC: 49.6 NG/ML (ref 30–100)
ALBUMIN SERPL BCG-MCNC: 4.5 G/DL (ref 3.5–5)
ALP SERPL-CCNC: 87 U/L (ref 34–104)
ALT SERPL W P-5'-P-CCNC: 26 U/L (ref 7–52)
ANION GAP SERPL CALCULATED.3IONS-SCNC: 11 MMOL/L (ref 4–13)
AST SERPL W P-5'-P-CCNC: 23 U/L (ref 13–39)
BASOPHILS # BLD AUTO: 0.03 THOUSANDS/ÂΜL (ref 0–0.1)
BASOPHILS NFR BLD AUTO: 1 % (ref 0–1)
BILIRUB SERPL-MCNC: 0.8 MG/DL (ref 0.2–1)
BUN SERPL-MCNC: 19 MG/DL (ref 5–25)
CALCIUM SERPL-MCNC: 9.8 MG/DL (ref 8.4–10.2)
CHLORIDE SERPL-SCNC: 104 MMOL/L (ref 96–108)
CHOLEST SERPL-MCNC: 264 MG/DL (ref ?–200)
CO2 SERPL-SCNC: 25 MMOL/L (ref 21–32)
CREAT SERPL-MCNC: 0.95 MG/DL (ref 0.6–1.3)
EOSINOPHIL # BLD AUTO: 0.2 THOUSAND/ÂΜL (ref 0–0.61)
EOSINOPHIL NFR BLD AUTO: 4 % (ref 0–6)
ERYTHROCYTE [DISTWIDTH] IN BLOOD BY AUTOMATED COUNT: 12.4 % (ref 11.6–15.1)
EST. AVERAGE GLUCOSE BLD GHB EST-MCNC: 108 MG/DL
GFR SERPL CREATININE-BSD FRML MDRD: 68 ML/MIN/1.73SQ M
GLUCOSE P FAST SERPL-MCNC: 100 MG/DL (ref 65–99)
HBA1C MFR BLD: 5.4 %
HCT VFR BLD AUTO: 41 % (ref 34.8–46.1)
HDLC SERPL-MCNC: 72 MG/DL
HGB BLD-MCNC: 13.7 G/DL (ref 11.5–15.4)
IMM GRANULOCYTES # BLD AUTO: 0.01 THOUSAND/UL (ref 0–0.2)
IMM GRANULOCYTES NFR BLD AUTO: 0 % (ref 0–2)
LDLC SERPL CALC-MCNC: 175 MG/DL (ref 0–100)
LYMPHOCYTES # BLD AUTO: 1.26 THOUSANDS/ÂΜL (ref 0.6–4.47)
LYMPHOCYTES NFR BLD AUTO: 25 % (ref 14–44)
MCH RBC QN AUTO: 32.9 PG (ref 26.8–34.3)
MCHC RBC AUTO-ENTMCNC: 33.4 G/DL (ref 31.4–37.4)
MCV RBC AUTO: 98 FL (ref 82–98)
MONOCYTES # BLD AUTO: 0.48 THOUSAND/ÂΜL (ref 0.17–1.22)
MONOCYTES NFR BLD AUTO: 9 % (ref 4–12)
NEUTROPHILS # BLD AUTO: 3.11 THOUSANDS/ÂΜL (ref 1.85–7.62)
NEUTS SEG NFR BLD AUTO: 61 % (ref 43–75)
NONHDLC SERPL-MCNC: 192 MG/DL
NRBC BLD AUTO-RTO: 0 /100 WBCS
PLATELET # BLD AUTO: 227 THOUSANDS/UL (ref 149–390)
PMV BLD AUTO: 11.2 FL (ref 8.9–12.7)
POTASSIUM SERPL-SCNC: 4 MMOL/L (ref 3.5–5.3)
PROT SERPL-MCNC: 7.3 G/DL (ref 6.4–8.4)
RBC # BLD AUTO: 4.17 MILLION/UL (ref 3.81–5.12)
SODIUM SERPL-SCNC: 140 MMOL/L (ref 135–147)
TRIGL SERPL-MCNC: 87 MG/DL (ref ?–150)
TSH SERPL DL<=0.05 MIU/L-ACNC: 3.02 UIU/ML (ref 0.45–4.5)
WBC # BLD AUTO: 5.09 THOUSAND/UL (ref 4.31–10.16)

## 2025-02-27 PROCEDURE — 80053 COMPREHEN METABOLIC PANEL: CPT

## 2025-02-27 PROCEDURE — 84443 ASSAY THYROID STIM HORMONE: CPT

## 2025-02-27 PROCEDURE — 83036 HEMOGLOBIN GLYCOSYLATED A1C: CPT

## 2025-02-27 PROCEDURE — 82306 VITAMIN D 25 HYDROXY: CPT

## 2025-02-27 PROCEDURE — 80061 LIPID PANEL: CPT

## 2025-02-27 PROCEDURE — 36415 COLL VENOUS BLD VENIPUNCTURE: CPT

## 2025-02-27 PROCEDURE — 85025 COMPLETE CBC W/AUTO DIFF WBC: CPT

## 2025-03-03 ENCOUNTER — RA CDI HCC (OUTPATIENT)
Dept: OTHER | Facility: HOSPITAL | Age: 55
End: 2025-03-03

## 2025-03-10 ENCOUNTER — OFFICE VISIT (OUTPATIENT)
Dept: FAMILY MEDICINE CLINIC | Facility: CLINIC | Age: 55
End: 2025-03-10
Payer: COMMERCIAL

## 2025-03-10 VITALS
DIASTOLIC BLOOD PRESSURE: 80 MMHG | SYSTOLIC BLOOD PRESSURE: 120 MMHG | HEART RATE: 94 BPM | BODY MASS INDEX: 28.89 KG/M2 | TEMPERATURE: 97.2 F | RESPIRATION RATE: 12 BRPM | WEIGHT: 157 LBS | HEIGHT: 62 IN | OXYGEN SATURATION: 98 %

## 2025-03-10 DIAGNOSIS — Z91.018 ALLERGY TO CHICKEN MEAT: Primary | ICD-10-CM

## 2025-03-10 DIAGNOSIS — E78.2 MIXED HYPERLIPIDEMIA: ICD-10-CM

## 2025-03-10 DIAGNOSIS — E66.3 OVERWEIGHT: ICD-10-CM

## 2025-03-10 PROCEDURE — 99214 OFFICE O/P EST MOD 30 MIN: CPT | Performed by: NURSE PRACTITIONER

## 2025-03-10 RX ORDER — PREDNISONE 10 MG/1
10 TABLET ORAL DAILY
Qty: 10 TABLET | Refills: 0 | Status: SHIPPED | OUTPATIENT
Start: 2025-03-10

## 2025-03-10 NOTE — PROGRESS NOTES
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  Patient does have EpiPen.  Patient is allergic to Benadryl.  Orders:    predniSONE 10 mg tablet; Take 1 tablet (10 mg total) by mouth daily    Mixed hyperlipidemia  Discussed low-fat diet.  And daily exercise.  Will repeat lipid panel prior to next visit in 3 months.  Orders:    Lipid panel; Future    Overweight  Patient has been doing great with semaglutide.                History of Present Illness   Patient presents for follow-up on semaglutide.  Her insurance would not cover Zepbound, so she did go to an online prescriber for semaglutide.  She feels she is doing well with medication and did lose about 9 pounds.  She also had her blood work done recently.      Review of Systems   Constitutional:  Negative for chills and fever.   HENT:  Negative for ear pain and sore throat.    Eyes:  Negative for pain and visual disturbance.   Respiratory:  Negative for cough and shortness of breath.    Cardiovascular:  Negative for chest pain and palpitations.   Gastrointestinal:  Negative for abdominal pain and vomiting.   Genitourinary:  Negative for dysuria and hematuria.   Musculoskeletal:  Negative for arthralgias and back pain.   Skin:  Negative for color change and rash.   Neurological:  Negative for dizziness, seizures, syncope, light-headedness and headaches.   Psychiatric/Behavioral:  The patient is nervous/anxious.    All other systems reviewed and are negative.      Objective   /80 (BP Location: Left arm, Cuff Size: Standard)   Pulse 94   Temp (!) 97.2 °F (36.2 °C)   Resp 12   Ht 5' 2\" (1.575 m)   Wt 71.2 kg (157 lb)   SpO2 98%   BMI 28.72 kg/m²      Physical Exam  Vitals and nursing note " reviewed.   Constitutional:       General: She is not in acute distress.     Appearance: She is well-developed.   HENT:      Head: Normocephalic and atraumatic.   Cardiovascular:      Rate and Rhythm: Normal rate and regular rhythm.      Heart sounds: No murmur heard.  Pulmonary:      Effort: Pulmonary effort is normal. No respiratory distress.      Breath sounds: Normal breath sounds.   Musculoskeletal:         General: No swelling.      Cervical back: Neck supple.   Skin:     General: Skin is warm and dry.      Capillary Refill: Capillary refill takes less than 2 seconds.   Neurological:      Mental Status: She is alert and oriented to person, place, and time.   Psychiatric:         Mood and Affect: Mood normal.

## 2025-05-26 DIAGNOSIS — Z91.018 ALLERGY TO CHICKEN MEAT: ICD-10-CM

## 2025-05-28 ENCOUNTER — OFFICE VISIT (OUTPATIENT)
Dept: DERMATOLOGY | Facility: CLINIC | Age: 55
End: 2025-05-28
Payer: COMMERCIAL

## 2025-05-28 DIAGNOSIS — L81.4 LENTIGO: Primary | ICD-10-CM

## 2025-05-28 DIAGNOSIS — D18.01 CHERRY ANGIOMA: ICD-10-CM

## 2025-05-28 DIAGNOSIS — D22.9 MELANOCYTIC NEVUS, UNSPECIFIED LOCATION: ICD-10-CM

## 2025-05-28 DIAGNOSIS — L85.3 XEROSIS OF SKIN: ICD-10-CM

## 2025-05-28 DIAGNOSIS — L82.1 SEBORRHEIC KERATOSES: ICD-10-CM

## 2025-05-28 PROCEDURE — 99203 OFFICE O/P NEW LOW 30 MIN: CPT | Performed by: DERMATOLOGY

## 2025-05-28 NOTE — PROGRESS NOTES
"[facility] Dermatology Clinic Note     Patient Name: Jessica Kelley  Encounter Date: 05/28/2025       Have you been cared for by a [facility] Dermatologist in the last 3 years and, if so, which description applies to you? NO. I am considered a \"new\" patient and must complete all patient intake questions. I am of child-bearing potential.     REVIEW OF SYSTEMS:  Have you recently had or currently have any of the following? Recent fever or chills? No  Any non-healing wound? No  Are you pregnant or planning to become pregnant? No  Are you currently or planning to be nursing or breast feeding? No   PAST MEDICAL HISTORY:  Have you personally ever had or currently have any of the following?  If \"YES,\" then please provide more detail. Skin cancer (such as Melanoma, Basal Cell Carcinoma, Squamous Cell Carcinoma?  No  Tuberculosis, HIV/AIDS, Hepatitis B or C: No  Radiation Treatment No   HISTORY OF IMMUNOSUPPRESSION:   Do you have a history of any of the following:  Systemic Immunosuppression such as Diabetes, Biologic or Immunotherapy, Chemotherapy, Organ Transplantation, Bone Marrow Transplantation or Prednsione?  No    Answering \"YES\" requires the addition of the dotphrase \"IMMUNOSUPPRESSED\" as the first diagnosis of the patient's visit.   FAMILY HISTORY:  Any \"first degree relatives\" (parent, brother, sister, or child) with the following?    Skin Cancer, Pancreatic or Other Cancer? No   PATIENT EXPERIENCE:    Do you want the Dermatologist to perform a COMPLETE skin exam today including a clinical examination under the \"bra and underwear\" areas?  Yes not under bra or underwear   If necessary, do we have your permission to call and leave a detailed message on your Preferred Phone number that includes your specific medical information?  Yes      Allergies[1] Current Medications[2]              Whom besides the patient is providing clinical information about today's encounter?   NO ADDITIONAL HISTORIAN (patient alone " "provided history)    Physical Exam and Assessment/Plan by Diagnosis:    MELANOCYTIC NEVI (\"Moles\")    Physical Exam:  Anatomic Location Affected:   Mostly on sun-exposed areas of the trunk and extremities  Morphological Description:  Scattered, 1-4mm round to ovoid, symmetrical-appearing, even bordered, skin colored to dark brown macules/papules, mostly in sun-exposed areas  Pertinent Positives:  Pertinent Negatives:    Additional History of Present Condition:      Assessment and Plan:  Based on a thorough discussion of this condition and the management approach to it (including a comprehensive discussion of the known risks, side effects and potential benefits of treatment), the patient (family) agrees to implement the following specific plan:  When outside we recommend using a wide brim hat, sunglasses, long sleeve and pants, sunscreen with SPF 30+ with reapplication every 2 hours, or SPF specific clothing   Benign, reassured  Annual skin check     Melanocytic Nevi  Melanocytic nevi (\"moles\") are tan or brown, raised or flat areas of the skin which have an increased number of melanocytes. Melanocytes are the cells in our body which make pigment and account for skin color.    Some moles are present at birth (I.e., \"congenital nevi\"), while others come up later in life (i.e., \"acquired nevi\").  The sun can stimulate the body to make more moles.  Sunburns are not the only thing that triggers more moles.  Chronic sun exposure can do it too.     Clinically distinguishing a healthy mole from melanoma may be difficult, even for experienced dermatologists. The \"ABCDE's\" of moles have been suggested as a means of helping to alert a person to a suspicious mole and the possible increased risk of melanoma.  The suggestions for raising alert are as follows:    Asymmetry: Healthy moles tend to be symmetric, while melanomas are often asymmetric.  Asymmetry means if you draw a line through the mole, the two halves do not match in " "color, size, shape, or surface texture. Asymmetry can be a result of rapid enlargement of a mole, the development of a raised area on a previously flat lesion, scaling, ulceration, bleeding or scabbing within the mole.  Any mole that starts to demonstrate \"asymmetry\" should be examined promptly by a board certified dermatologist.     Border: Healthy moles tend to have discrete, even borders.  The border of a melanoma often blends into the normal skin and does not sharply delineate the mole from normal skin.  Any mole that starts to demonstrate \"uneven borders\" should be examined promptly by a board certified dermatologist.     Color: Healthy moles tend to be one color throughout.  Melanomas tend to be made up of different colors ranging from dark black, blue, white, or red.  Any mole that demonstrates a color change should be examined promptly by a board certified dermatologist.     Diameter: Healthy moles tend to be smaller than 0.6 cm in size; an exception are \"congenital nevi\" that can be larger.  Melanomas tend to grow and can often be greater than 0.6 cm (1/4 of an inch, or the size of a pencil eraser). This is only a guideline, and many normal moles may be larger than 0.6 cm without being unhealthy.  Any mole that starts to change in size (small to bigger or bigger to smaller) should be examined promptly by a board certified dermatologist.     Evolving: Healthy moles tend to \"stay the same.\"  Melanomas may often show signs of change or evolution such as a change in size, shape, color, or elevation.  Any mole that starts to itch, bleed, crust, burn, hurt, or ulcerate or demonstrate a change or evolution should be examined promptly by a board certified dermatologist.        LENTIGO    Physical Exam:  Anatomic Location Affected:  trunk, arms  Morphological Description:  Light brown macules  Pertinent Positives:  Pertinent Negatives:    Additional History of Present Condition:      Assessment and Plan:  Based on a " thorough discussion of this condition and the management approach to it (including a comprehensive discussion of the known risks, side effects and potential benefits of treatment), the patient (family) agrees to implement the following specific plan:  When outside we recommend using a wide brim hat, sunglasses, long sleeve and pants, sunscreen with SPF 30+ with reapplication every 2 hours, or SPF specific clothing       What is a lentigo?  A lentigo is a pigmented flat or slightly raised lesion with a clearly defined edge. Unlike an ephelis (freckle), it does not fade in the winter months. There are several kinds of lentigo.  The name lentigo originally referred to its appearance resembling a small lentil. The plural of lentigo is lentigines, although “lentigos” is also in common use.    Who gets lentigines?  Lentigines can affect males and females of all ages and races. Solar lentigines are especially prevalent in fair skinned adults. Lentigines associated with syndromes are present at birth or arise during childhood.    What causes lentigines?  Common forms of lentigo are due to exposure to ultraviolet radiation:  Sun damage including sunburn   Indoor tanning   Phototherapy, especially photochemotherapy (PUVA)    Ionizing radiation, eg radiation therapy, can also cause lentigines.  Several familial syndromes associated with widespread lentigines originate from mutations in Pancho-MAP kinase, mTOR signaling and PTEN pathways.    What is the treatment for lentigines?  Most lentigines are left alone. Attempts to lighten them may not be successful. The following approaches are used:  SPF 50+ broad-spectrum sunscreen   Hydroquinone bleaching cream   Alpha hydroxy acids   Vitamin C   Retinoids   Azelaic acid   Chemical peels  Individual lesions can be permanently removed using:  Cryotherapy   Intense pulsed light   Pigment lasers    How can lentigines be prevented?  Lentigines associated with exposure ultraviolet radiation  "can be prevented by very careful sun protection. Clothing is more successful at preventing new lentigines than are sunscreens.    What is the outlook for lentigines?  Lentigines usually persist. They may increase in number with age and sun exposure. Some in sun-protected sites may fade and disappear.    GOLDEN ANGIOMAS    Physical Exam:  Anatomic Location Affected:  trunk  Morphological Description:  Scattered cherry red, 1-4 mm papules.  Pertinent Positives:  Pertinent Negatives:    Additional History of Present Condition:      Assessment and Plan:  Based on a thorough discussion of this condition and the management approach to it (including a comprehensive discussion of the known risks, side effects and potential benefits of treatment), the patient (family) agrees to implement the following specific plan:  Monitor for changes  Benign, reassured      Assessment and Plan:    Cherry angioma, also known as Hinojosa de Ketan spots, are benign vascular skin lesions. A \"cherry angioma\" is a firm red, blue or purple papule, 0.1-1 cm in diameter. When thrombosed, they can appear black in colour until evaluated with a dermatoscope when the red or purple colour is more easily seen. Cherry angioma may develop on any part of the body but most often appear on the scalp, face, lips and trunk.  An angioma is due to proliferating endothelial cells; these are the cells that line the inside of a blood vessel.    Angiomas can arise in early life or later in life; the most common type of angioma is a cherry angioma.  Cherry angiomas are very common in males and females of any age or race. They are more noticeable in white skin than in skin of colour. They markedly increase in number from about the age of 40. There may be a family history of similar lesions. Eruptive cherry angiomas have been rarely reported to be associated with internal malignancy. The cause of angiomas is unknown. Genetic analysis of cherry angiomas has shown that " "they frequently carry specific somatic missense mutations in the GNAQ and GNA11 (Q209H) genes, which are involved in other vascular and melanocytic proliferations.      SEBORRHEIC KERATOSIS; NON-INFLAMED    Physical Exam:  Anatomic Location Affected:  trunk  Morphological Description:  Flat and raised, waxy, smooth to warty textured, yellow to brownish-grey to dark brown to blackish, discrete, \"stuck-on\" appearing papules.  Pertinent Positives:  Pertinent Negatives:    Additional History of Present Condition:      Assessment and Plan:  Based on a thorough discussion of this condition and the management approach to it (including a comprehensive discussion of the known risks, side effects and potential benefits of treatment), the patient (family) agrees to implement the following specific plan:  Monitor for changes  Benign, reassured      Seborrheic Keratosis  A seborrheic keratosis is a harmless warty spot that appears during adult life as a common sign of skin aging.  Seborrheic keratoses can arise on any area of skin, covered or uncovered, with the usual exception of the palms and soles. They do not arise from mucous membranes. Seborrheic keratoses can have highly variable appearance.      Seborrheic keratoses are extremely common. It has been estimated that over 90% of adults over the age of 60 years have one or more of them. They occur in males and females of all races, typically beginning to erupt in the 30s or 40s. They are uncommon under the age of 20 years.  The precise cause of seborrhoeic keratoses is not known.  Seborrhoeic keratoses are considered degenerative in nature. As time goes by, seborrheic keratoses tend to become more numerous. Some people inherit a tendency to develop a very large number of them; some people may have hundreds of them.      There is no easy way to remove multiple lesions on a single occasion.  Unless a specific lesion is \"inflamed\" and is causing pain or stinging/burning or is " "bleeding, most insurance companies do not authorize treatment.    XEROSIS (\"DRY SKIN\")    Physical Exam:  Anatomic Location Affected:  diffuse  Morphological Description:  xerosis  Pertinent Positives:  Pertinent Negatives:    Additional History of Present Condition:      Assessment and Plan:  Based on a thorough discussion of this condition and the management approach to it (including a comprehensive discussion of the known risks, side effects and potential benefits of treatment), the patient (family) agrees to implement the following specific plan:  Use moisturizer like Eucerin,Cerave or Aveeno Cream 3 times a day for the dry skin            Dry skin refers to skin that feels dry to touch. Dry skin has a dull surface with a rough, scaly quality. The skin is less pliable and cracked. When dryness is severe, the skin may become inflamed and fissured.  Although any body site can be dry, dry skin tends to affect the shins more than any other site.    Dry skin is lacking moisture in the outer horny cell layer (stratum corneum) and this results in cracks in the skin surface.  Dry skin is also called xerosis, xeroderma or asteatosis (lack of fat).  It can affect males and females of all ages. There is some racial variability in water and lipid content of the skin.  Dry skin that starts in early childhood may be one of about 20 types of ichthyosis (fish-scale skin). There is often a family history of dry skin.   Dry skin is commonly seen in people with atopic dermatitis.  Nearly everyone > 60 years has dry skin.    Dry skin that begins later may be seen in people with certain diseases and conditions.  Postmenopausal women  Hypothyroidism  Chronic renal disease   Malnutrition and weight loss   Subclinical dermatitis   Treatment with certain drugs such as oral retinoids, diuretics and epidermal growth factor receptor inhibitors      What is the treatment for dry skin?  The mainstay of treatment of dry skin and ichthyosis is " moisturisers/emollients. They should be applied liberally and often enough to:  Reduce itch   Improve the barrier function   Prevent entry of irritants, bacteria   Reduce transepidermal water loss.      How can dry skin be prevented?  Eliminate aggravating factors:  Reduce the frequency of bathing.   A humidifier in winter and air conditioner in summer   Compare having a short shower with a prolonged soak in a bath.   Use lukewarm, not hot, water.   Replace standard soap with a substitute such as a synthetic detergent cleanser, water-miscible emollient, bath oil, anti-pruritic tar oil, colloidal oatmeal etc.   Apply an emollient liberally and often, particularly shortly after bathing, and when itchy. The drier the skin, the thicker this should be, especially on the hands.    What is the outlook for dry skin?  A tendency to dry skin may persist life-long, or it may improve once contributing factors are controlled.     Scribe Attestation      I,:  Lashanda Lambert am acting as a scribe while in the presence of the attending physician.:       I,:  Jodi Porras MD personally performed the services described in this documentation    as scribed in my presence.:                   [1]   Allergies  Allergen Reactions    Diphenhydramine Tachycardia     Other reaction(s): Unknown  Other reaction(s): panic attack, swelling, hives      Amoxicillin Hives and Rash    Chicken Allergy - Food Allergy Swelling, Sneezing, Fatigue, Throat Swelling, Eye Swelling, Tongue Swelling and Facial Swelling     Skin prick positive 4/29/24.  Has EOE and can cause swallowing problems    Other Other (See Comments)     10/29/24Skin prick positive to turkey.  Has EOE and can cause swallowing difficulty    Poultry Meal - Food Allergy Eye Swelling, Facial Swelling, Nasal Congestion, Palpitations, Swelling, Tachycardia, Throat Swelling and Tongue Swelling   [2]   Current Outpatient Medications:     ALPRAZolam (XANAX) 0.5 mg tablet, Take 0.5 mg by mouth  2 (two) times a day as needed, Disp: , Rfl: 0    EPINEPHrine (EPIPEN) 0.3 mg/0.3 mL SOAJ, Inject 0.3 mL (0.3 mg total) into a muscle once for 1 dose, Disp: 0.6 mL, Rfl: 0    flunisolide (NASALIDE) 25 MCG/ACT (0.025%) SOLN, flunisolide 25 mcg (0.025 %) nasal spray, Disp: , Rfl:     fluticasone (FLONASE) 50 mcg/act nasal spray, 1 spray into each nostril daily, Disp: 15.8 mL, Rfl: 3    Multiple Vitamins-Minerals (MULTIVITAMIN ADULT PO), 1 DAILY, Disp: , Rfl:     predniSONE 10 mg tablet, Take 1 tablet (10 mg total) by mouth daily, Disp: 10 tablet, Rfl: 0    SEMAGLUTIDE-WEIGHT MANAGEMENT SC, , Disp: , Rfl:

## 2025-05-29 RX ORDER — PREDNISONE 10 MG/1
10 TABLET ORAL DAILY
Qty: 10 TABLET | Refills: 0 | Status: SHIPPED | OUTPATIENT
Start: 2025-05-29

## 2025-06-05 ENCOUNTER — OFFICE VISIT (OUTPATIENT)
Dept: FAMILY MEDICINE CLINIC | Facility: CLINIC | Age: 55
End: 2025-06-05
Payer: COMMERCIAL

## 2025-06-05 VITALS
BODY MASS INDEX: 27.97 KG/M2 | DIASTOLIC BLOOD PRESSURE: 80 MMHG | RESPIRATION RATE: 18 BRPM | WEIGHT: 152 LBS | OXYGEN SATURATION: 97 % | HEART RATE: 88 BPM | SYSTOLIC BLOOD PRESSURE: 120 MMHG | HEIGHT: 62 IN

## 2025-06-05 DIAGNOSIS — Z78.0 POSTMENOPAUSAL: Primary | ICD-10-CM

## 2025-06-05 DIAGNOSIS — E66.3 OVERWEIGHT: ICD-10-CM

## 2025-06-05 DIAGNOSIS — K20.0 EOSINOPHILIC ESOPHAGITIS: ICD-10-CM

## 2025-06-05 PROCEDURE — 99214 OFFICE O/P EST MOD 30 MIN: CPT | Performed by: NURSE PRACTITIONER

## 2025-06-05 NOTE — ASSESSMENT & PLAN NOTE
Great job with weight loss.  Continue on semaglutide.  Advise daily exercise and healthy diet.

## 2025-06-05 NOTE — ASSESSMENT & PLAN NOTE
Patient does have prednisone on hand in case she has food reaction.  Patient states she believes she is due for EGD.

## 2025-06-05 NOTE — PROGRESS NOTES
"Name: Jessica Kelley      : 1970      MRN: [id number]  Encounter Provider: ANSLEY Spangler  Encounter Date: 2025   Encounter department: [facility] [address] [place]  :  Assessment & Plan  Postmenopausal    Orders:    DXA bone density spine hip and pelvis; Future    Eosinophilic esophagitis  Patient does have prednisone on hand in case she has food reaction.  Patient states she believes she is due for EGD.       Overweight  Great job with weight loss.  Continue on semaglutide.  Advise daily exercise and healthy diet.                History of Present Illness   Patient presents for routine follow-up.  She did lose another 5 pounds with semaglutide.      Review of Systems   Constitutional:  Negative for chills and fever.   HENT:  Negative for ear pain and sore throat.    Eyes:  Negative for pain and visual disturbance.   Respiratory:  Negative for cough and shortness of breath.    Cardiovascular:  Negative for chest pain and palpitations.   Gastrointestinal:  Negative for abdominal pain and vomiting.   Genitourinary:  Negative for dysuria and hematuria.   Musculoskeletal:  Negative for arthralgias and back pain.   Skin:  Negative for color change and rash.   Neurological:  Negative for dizziness, seizures, syncope, light-headedness and headaches.   All other systems reviewed and are negative.      Objective   /80 (BP Location: Left arm, Patient Position: Sitting, Cuff Size: Standard)   Pulse 88   Resp 18   Ht 5' 2\" (1.575 m)   Wt 68.9 kg (152 lb)   SpO2 97%   BMI 27.80 kg/m²      Physical Exam  Vitals and nursing note reviewed.   Constitutional:       General: She is not in acute distress.     Appearance: She is well-developed.   HENT:      Head: Normocephalic and atraumatic.     Cardiovascular:      Rate and Rhythm: Normal rate and regular rhythm.      Heart sounds: No murmur heard.  Pulmonary:      Effort: Pulmonary effort is normal. No respiratory " distress.      Breath sounds: Normal breath sounds.     Musculoskeletal:         General: No swelling.      Cervical back: Neck supple.     Skin:     General: Skin is warm and dry.      Capillary Refill: Capillary refill takes less than 2 seconds.     Neurological:      Mental Status: She is alert and oriented to person, place, and time.     Psychiatric:         Mood and Affect: Mood normal.

## 2025-08-05 ENCOUNTER — HOSPITAL ENCOUNTER (EMERGENCY)
Facility: HOSPITAL | Age: 55
Discharge: HOME | End: 2025-08-05
Attending: EMERGENCY MEDICINE
Payer: COMMERCIAL

## 2025-08-05 ENCOUNTER — APPOINTMENT (EMERGENCY)
Dept: RADIOLOGY | Facility: HOSPITAL | Age: 55
End: 2025-08-05
Payer: COMMERCIAL

## 2025-08-07 ENCOUNTER — HOSPITAL ENCOUNTER (OUTPATIENT)
Age: 55
Discharge: HOME | End: 2025-08-07
Payer: COMMERCIAL

## 2025-08-07 DIAGNOSIS — Z78.0 POSTMENOPAUSAL: ICD-10-CM

## 2025-08-07 PROCEDURE — 77080 DXA BONE DENSITY AXIAL: CPT

## 2025-08-08 ENCOUNTER — OFFICE VISIT (OUTPATIENT)
Dept: OBGYN CLINIC | Facility: CLINIC | Age: 55
End: 2025-08-08
Payer: COMMERCIAL

## 2025-08-08 VITALS — WEIGHT: 152 LBS | HEIGHT: 62 IN | RESPIRATION RATE: 18 BRPM | BODY MASS INDEX: 27.97 KG/M2

## 2025-08-08 DIAGNOSIS — M62.830 SPASM OF RIGHT TRAPEZIUS MUSCLE: Primary | ICD-10-CM

## 2025-08-08 DIAGNOSIS — M54.12 CERVICAL RADICULOPATHY: ICD-10-CM

## 2025-08-08 PROCEDURE — 99213 OFFICE O/P EST LOW 20 MIN: CPT | Performed by: ORTHOPAEDIC SURGERY
